# Patient Record
Sex: MALE | Race: BLACK OR AFRICAN AMERICAN | NOT HISPANIC OR LATINO | Employment: FULL TIME | URBAN - METROPOLITAN AREA
[De-identification: names, ages, dates, MRNs, and addresses within clinical notes are randomized per-mention and may not be internally consistent; named-entity substitution may affect disease eponyms.]

---

## 2024-11-26 ENCOUNTER — APPOINTMENT (OUTPATIENT)
Dept: RADIOLOGY | Facility: HOSPITAL | Age: 39
End: 2024-11-26

## 2024-11-26 ENCOUNTER — APPOINTMENT (OUTPATIENT)
Dept: CARDIOLOGY | Facility: HOSPITAL | Age: 39
End: 2024-11-26

## 2024-11-26 ENCOUNTER — HOSPITAL ENCOUNTER (INPATIENT)
Facility: HOSPITAL | Age: 39
LOS: 3 days | Discharge: HOME | End: 2024-11-29
Attending: STUDENT IN AN ORGANIZED HEALTH CARE EDUCATION/TRAINING PROGRAM | Admitting: INTERNAL MEDICINE

## 2024-11-26 DIAGNOSIS — E11.9 TYPE 2 DIABETES MELLITUS WITHOUT COMPLICATION, WITHOUT LONG-TERM CURRENT USE OF INSULIN (MULTI): ICD-10-CM

## 2024-11-26 DIAGNOSIS — I48.91 ATRIAL FIBRILLATION WITH RVR (MULTI): ICD-10-CM

## 2024-11-26 DIAGNOSIS — I10 HYPERTENSION, UNSPECIFIED TYPE: ICD-10-CM

## 2024-11-26 DIAGNOSIS — I50.9 ACUTE ON CHRONIC CONGESTIVE HEART FAILURE, UNSPECIFIED HEART FAILURE TYPE: Primary | ICD-10-CM

## 2024-11-26 DIAGNOSIS — J90 PLEURAL EFFUSION: ICD-10-CM

## 2024-11-26 LAB
ANION GAP SERPL CALC-SCNC: 10 MMOL/L (ref 10–20)
BASOPHILS # BLD AUTO: 0.05 X10*3/UL (ref 0–0.1)
BASOPHILS NFR BLD AUTO: 0.6 %
BNP SERPL-MCNC: 352 PG/ML (ref 0–99)
BUN SERPL-MCNC: 8 MG/DL (ref 6–23)
CALCIUM SERPL-MCNC: 8.7 MG/DL (ref 8.6–10.3)
CARDIAC TROPONIN I PNL SERPL HS: 55 NG/L (ref 0–20)
CARDIAC TROPONIN I PNL SERPL HS: 58 NG/L (ref 0–20)
CARDIAC TROPONIN I PNL SERPL HS: 61 NG/L (ref 0–20)
CHLORIDE SERPL-SCNC: 100 MMOL/L (ref 98–107)
CO2 SERPL-SCNC: 27 MMOL/L (ref 21–32)
CREAT SERPL-MCNC: 0.86 MG/DL (ref 0.5–1.3)
EGFRCR SERPLBLD CKD-EPI 2021: >90 ML/MIN/1.73M*2
EOSINOPHIL # BLD AUTO: 0.09 X10*3/UL (ref 0–0.7)
EOSINOPHIL NFR BLD AUTO: 1.1 %
ERYTHROCYTE [DISTWIDTH] IN BLOOD BY AUTOMATED COUNT: 13.1 % (ref 11.5–14.5)
GLUCOSE SERPL-MCNC: 161 MG/DL (ref 74–99)
HCT VFR BLD AUTO: 38.2 % (ref 41–52)
HGB BLD-MCNC: 12.9 G/DL (ref 13.5–17.5)
IMM GRANULOCYTES # BLD AUTO: 0.02 X10*3/UL (ref 0–0.7)
IMM GRANULOCYTES NFR BLD AUTO: 0.2 % (ref 0–0.9)
INR PPP: 1.4 (ref 0.9–1.1)
LYMPHOCYTES # BLD AUTO: 1.53 X10*3/UL (ref 1.2–4.8)
LYMPHOCYTES NFR BLD AUTO: 18.2 %
MCH RBC QN AUTO: 31.7 PG (ref 26–34)
MCHC RBC AUTO-ENTMCNC: 33.8 G/DL (ref 32–36)
MCV RBC AUTO: 94 FL (ref 80–100)
MONOCYTES # BLD AUTO: 0.7 X10*3/UL (ref 0.1–1)
MONOCYTES NFR BLD AUTO: 8.3 %
NEUTROPHILS # BLD AUTO: 6.02 X10*3/UL (ref 1.2–7.7)
NEUTROPHILS NFR BLD AUTO: 71.6 %
NRBC BLD-RTO: 0 /100 WBCS (ref 0–0)
PLATELET # BLD AUTO: 240 X10*3/UL (ref 150–450)
POTASSIUM SERPL-SCNC: 4.1 MMOL/L (ref 3.5–5.3)
PROTHROMBIN TIME: 16.3 SECONDS (ref 9.8–12.8)
RBC # BLD AUTO: 4.07 X10*6/UL (ref 4.5–5.9)
SODIUM SERPL-SCNC: 133 MMOL/L (ref 136–145)
WBC # BLD AUTO: 8.4 X10*3/UL (ref 4.4–11.3)

## 2024-11-26 PROCEDURE — 80048 BASIC METABOLIC PNL TOTAL CA: CPT | Performed by: STUDENT IN AN ORGANIZED HEALTH CARE EDUCATION/TRAINING PROGRAM

## 2024-11-26 PROCEDURE — 84484 ASSAY OF TROPONIN QUANT: CPT | Performed by: STUDENT IN AN ORGANIZED HEALTH CARE EDUCATION/TRAINING PROGRAM

## 2024-11-26 PROCEDURE — 2500000004 HC RX 250 GENERAL PHARMACY W/ HCPCS (ALT 636 FOR OP/ED): Performed by: STUDENT IN AN ORGANIZED HEALTH CARE EDUCATION/TRAINING PROGRAM

## 2024-11-26 PROCEDURE — 71275 CT ANGIOGRAPHY CHEST: CPT

## 2024-11-26 PROCEDURE — 71045 X-RAY EXAM CHEST 1 VIEW: CPT

## 2024-11-26 PROCEDURE — 2500000002 HC RX 250 W HCPCS SELF ADMINISTERED DRUGS (ALT 637 FOR MEDICARE OP, ALT 636 FOR OP/ED): Performed by: STUDENT IN AN ORGANIZED HEALTH CARE EDUCATION/TRAINING PROGRAM

## 2024-11-26 PROCEDURE — 36415 COLL VENOUS BLD VENIPUNCTURE: CPT | Performed by: STUDENT IN AN ORGANIZED HEALTH CARE EDUCATION/TRAINING PROGRAM

## 2024-11-26 PROCEDURE — 2500000004 HC RX 250 GENERAL PHARMACY W/ HCPCS (ALT 636 FOR OP/ED): Performed by: INTERNAL MEDICINE

## 2024-11-26 PROCEDURE — 2500000001 HC RX 250 WO HCPCS SELF ADMINISTERED DRUGS (ALT 637 FOR MEDICARE OP): Performed by: STUDENT IN AN ORGANIZED HEALTH CARE EDUCATION/TRAINING PROGRAM

## 2024-11-26 PROCEDURE — 93005 ELECTROCARDIOGRAM TRACING: CPT

## 2024-11-26 PROCEDURE — 85610 PROTHROMBIN TIME: CPT | Performed by: STUDENT IN AN ORGANIZED HEALTH CARE EDUCATION/TRAINING PROGRAM

## 2024-11-26 PROCEDURE — 96376 TX/PRO/DX INJ SAME DRUG ADON: CPT

## 2024-11-26 PROCEDURE — 96375 TX/PRO/DX INJ NEW DRUG ADDON: CPT

## 2024-11-26 PROCEDURE — 71045 X-RAY EXAM CHEST 1 VIEW: CPT | Performed by: RADIOLOGY

## 2024-11-26 PROCEDURE — 99285 EMERGENCY DEPT VISIT HI MDM: CPT | Mod: 25

## 2024-11-26 PROCEDURE — 96374 THER/PROPH/DIAG INJ IV PUSH: CPT

## 2024-11-26 PROCEDURE — 2550000001 HC RX 255 CONTRASTS: Performed by: STUDENT IN AN ORGANIZED HEALTH CARE EDUCATION/TRAINING PROGRAM

## 2024-11-26 PROCEDURE — 85025 COMPLETE CBC W/AUTO DIFF WBC: CPT | Performed by: STUDENT IN AN ORGANIZED HEALTH CARE EDUCATION/TRAINING PROGRAM

## 2024-11-26 PROCEDURE — 83880 ASSAY OF NATRIURETIC PEPTIDE: CPT | Performed by: STUDENT IN AN ORGANIZED HEALTH CARE EDUCATION/TRAINING PROGRAM

## 2024-11-26 PROCEDURE — 71275 CT ANGIOGRAPHY CHEST: CPT | Performed by: RADIOLOGY

## 2024-11-26 PROCEDURE — 1210000001 HC SEMI-PRIVATE ROOM DAILY

## 2024-11-26 RX ORDER — METOPROLOL TARTRATE 1 MG/ML
5 INJECTION, SOLUTION INTRAVENOUS ONCE
Status: COMPLETED | OUTPATIENT
Start: 2024-11-26 | End: 2024-11-26

## 2024-11-26 RX ORDER — WARFARIN SODIUM 5 MG/1
5 TABLET ORAL ONCE
Status: COMPLETED | OUTPATIENT
Start: 2024-11-26 | End: 2024-11-26

## 2024-11-26 RX ORDER — FUROSEMIDE 10 MG/ML
40 INJECTION INTRAMUSCULAR; INTRAVENOUS EVERY 12 HOURS
Status: DISCONTINUED | OUTPATIENT
Start: 2024-11-26 | End: 2024-11-27

## 2024-11-26 RX ORDER — DEXTROSE 50 % IN WATER (D50W) INTRAVENOUS SYRINGE
25
Status: DISCONTINUED | OUTPATIENT
Start: 2024-11-26 | End: 2024-11-29 | Stop reason: HOSPADM

## 2024-11-26 RX ORDER — METOPROLOL SUCCINATE 50 MG/1
50 TABLET, EXTENDED RELEASE ORAL DAILY
Status: DISCONTINUED | OUTPATIENT
Start: 2024-11-27 | End: 2024-11-27

## 2024-11-26 RX ORDER — FUROSEMIDE 10 MG/ML
40 INJECTION INTRAMUSCULAR; INTRAVENOUS ONCE
Status: COMPLETED | OUTPATIENT
Start: 2024-11-26 | End: 2024-11-26

## 2024-11-26 RX ORDER — SPIRONOLACTONE 25 MG/1
25 TABLET ORAL DAILY
Status: DISCONTINUED | OUTPATIENT
Start: 2024-11-27 | End: 2024-11-26

## 2024-11-26 RX ORDER — DEXTROSE 50 % IN WATER (D50W) INTRAVENOUS SYRINGE
12.5
Status: DISCONTINUED | OUTPATIENT
Start: 2024-11-26 | End: 2024-11-29 | Stop reason: HOSPADM

## 2024-11-26 RX ORDER — SPIRONOLACTONE 25 MG/1
25 TABLET ORAL DAILY
Status: DISCONTINUED | OUTPATIENT
Start: 2024-11-26 | End: 2024-11-29 | Stop reason: HOSPADM

## 2024-11-26 RX ORDER — METOPROLOL SUCCINATE 25 MG/1
25 TABLET, EXTENDED RELEASE ORAL ONCE
Status: COMPLETED | OUTPATIENT
Start: 2024-11-26 | End: 2024-11-26

## 2024-11-26 RX ORDER — LANOLIN ALCOHOL/MO/W.PET/CERES
100 CREAM (GRAM) TOPICAL DAILY
Status: DISCONTINUED | OUTPATIENT
Start: 2024-11-27 | End: 2024-11-29 | Stop reason: HOSPADM

## 2024-11-26 RX ORDER — INSULIN LISPRO 100 [IU]/ML
0-10 INJECTION, SOLUTION INTRAVENOUS; SUBCUTANEOUS
Status: DISCONTINUED | OUTPATIENT
Start: 2024-11-27 | End: 2024-11-29 | Stop reason: HOSPADM

## 2024-11-26 RX ORDER — MULTIVIT-MIN/IRON FUM/FOLIC AC 7.5 MG-4
1 TABLET ORAL DAILY
Status: DISCONTINUED | OUTPATIENT
Start: 2024-11-27 | End: 2024-11-29 | Stop reason: HOSPADM

## 2024-11-26 RX ORDER — FOLIC ACID 1 MG/1
1 TABLET ORAL DAILY
Status: DISCONTINUED | OUTPATIENT
Start: 2024-11-27 | End: 2024-11-29 | Stop reason: HOSPADM

## 2024-11-26 ASSESSMENT — PAIN SCALES - GENERAL: PAINLEVEL_OUTOF10: 0 - NO PAIN

## 2024-11-26 ASSESSMENT — PAIN - FUNCTIONAL ASSESSMENT: PAIN_FUNCTIONAL_ASSESSMENT: 0-10

## 2024-11-26 ASSESSMENT — ENCOUNTER SYMPTOMS
ENDOCRINE NEGATIVE: 1
ALLERGIC/IMMUNOLOGIC NEGATIVE: 1
GASTROINTESTINAL NEGATIVE: 1
FATIGUE: 1
PSYCHIATRIC NEGATIVE: 1
EYES NEGATIVE: 1
NEUROLOGICAL NEGATIVE: 1
SHORTNESS OF BREATH: 1
HEMATOLOGIC/LYMPHATIC NEGATIVE: 1
ACTIVITY CHANGE: 1
MUSCULOSKELETAL NEGATIVE: 1

## 2024-11-26 ASSESSMENT — COLUMBIA-SUICIDE SEVERITY RATING SCALE - C-SSRS
1. IN THE PAST MONTH, HAVE YOU WISHED YOU WERE DEAD OR WISHED YOU COULD GO TO SLEEP AND NOT WAKE UP?: NO
2. HAVE YOU ACTUALLY HAD ANY THOUGHTS OF KILLING YOURSELF?: NO
6. HAVE YOU EVER DONE ANYTHING, STARTED TO DO ANYTHING, OR PREPARED TO DO ANYTHING TO END YOUR LIFE?: NO

## 2024-11-26 NOTE — ED TRIAGE NOTES
Pt. To ED for feet swelling and pain, SOB. Pt. States he was recently diagnosed with heart failure and DM.  Pt. Non compliant with medications

## 2024-11-26 NOTE — ED PROVIDER NOTES
Emergency Department Provider Note        History of Present Illness     Chief Complaint: Shortness of Breath, Leg Swelling, and Foot Swelling   History provided by: Patient  Limitations to History: None  External Chart Review: See ED Course    HPI:  Haider Ruiz is a 39 y.o. male with PMHx of CHF, HTN, T2DM, A fib presenting to the ED for shortness of breath and lower extremity edema.  Patient reports over the past month he has developed progressively worsening bilateral lower extremity edema.  Has some associated orthopnea and dyspnea on exertion but denies chest pain.  Denies fevers, chills, cough.  Reports that he is supposed to be on several medications but has not been taking any of them for the past few months because he was feeling well.    Physical Exam   Triage vitals:  T 36.6 °C (97.8 °F)  HR (!) 132  BP (!) 195/154  RR (!) 22  O2 97 % None (Room air)    Constitutional: Awake, alert, not in acute distress  HENT: Head atraumatic, mucous membranes moist  Eyes:  Conjunctivae normal  Neck:  Supple  Lungs: Clear to auscultation, breath sounds equal and symmetric, no wheezes, rales, or rhonchi  Heart: Tachycardic rate, irregular rhythm, no murmur, rub or gallop  Abdomen: Soft, nontender, nondistended, no rebound or guarding  Extremities: 2+ bilateral lower extremity edema  Neuro: Alert, no focal deficit  Skin: Warm, dry  Psych: Calm, cooperative       Medical Decision Making & ED Course   Medical Decision Making:  Haider Ruiz is a 39 y.o. male with PMHx as above in HPI who presented to the ED for SOB and BLE edema. Patient was afebrile, tachycardic though normotensive, and satting on room air on arrival.     Exam as above.    EKG showed sinus tachycardia. Though later on personal review and interpretation of telemetry monitor noted to be in a fib vs flutter with RVR.    CXR without evidence of PTX, PNA, widened mediastinum, or pulmonary edema.  CTPE negative for PE.    Labs below in ED course, notable  for CBC: slight anemia, BMP: no clinically significant electrolyte abnormalities, no ROXY high sensitivity troponin elevated but stable on repeat, doubt ACS, BNP mildly elevated though may be falsely reassuring in setting of pt's body habitus.     His home meds were restarted. Diuresis was initiated with lasix. He was given IV metop x2 with improvement in HR. He will require admission for further diuresis and re-initiation of home meds.     Patient signed out to Dr Roach. Pending admit.  ------------------------------------------------  Independent Test Interpretation: See ED Course    ED Course:  ED Course as of 11/26/24 2342   Tue Nov 26, 2024   1448 External chart review:  DC summary Feb 2024  Admitted for  HTNive emergency     [SS]   1504 ECG 12 lead  I have personally reviewed and interpreted this EKG.  Sinus tachycardia, rate 138 BPM  Normal axis  IA interval and QRS duration within normal limits.  QTc within normal limits.  No signs of acute ischemia or infarction [SS]   1518 XR chest 1 view  I have personally reviewed and interpreted this CXR, which shows enlarged cardiac silhouette, no PTX or PNA. Final decision making pending radiology read.   [SS]   1523 XR chest 1 view  Radiology read:  IMPRESSION:  1. Mild enlargement of the cardiac silhouette. No acute abnormality   [SS]   1602 CBC and Auto Differential(!)  anemia, no leukocytosis or thrombocytopenia [SS]   1627 Basic metabolic panel(!)  Hyperglycemia without evidence of DKA, slight hyponatremia, no ROXY [SS]   1628 BNP(!): 352  Mild elevation [SS]   1641 Troponin I, High Sensitivity(!!): 55  Mild elevation will trend [SS]   1839 CT angio chest for pulmonary embolism  Radiology read:  IMPRESSION:  1. Limited examination with suboptimal assessment of the lobar,  segmental, and subsegmental arteries. No large central filling defect  within the main pulmonary arteries.  2. Enlargement of the main pulmonary artery which may be seen in the  setting of  pulmonary arterial hypertension.  3. Cardiomegaly.  4. Small right pleural effusion and adjacent atelectasis.  5. Borderline aneurysmal dilatation of the ascending thoracic aorta  measuring up to 3.9 cm. Continued surveillance recommended.   [SS]   1853 Troponin I, High Sensitivity(!!): 61  Slight uptrend in setting of CHFe will cont to trend [SS]   2128 ECG read and interpreted by me.  Atrial flutter with variable AV block, rate 102.  Normal axis.  Normal intervals.  No ST or T wave derangements. [CG]   2222 Patient was accepted to the IM service by Dr. Tavares. [CG]      ED Course User Index  [CG] Graciela Roach MD  [SS] Steffen Simerlink, MD         Diagnoses as of 11/26/24 2345   Acute on chronic congestive heart failure, unspecified heart failure type   Pleural effusion   Hypertension, unspecified type   Atrial fibrillation with RVR (Multi)     Disposition   As a result of their workup, the patient will require admission to the hospital.  The patient was informed of his diagnosis.  The patient was given the opportunity to ask questions and I answered them. The patient agreed to be admitted to the hospital.    Procedures   Procedures    Steffen Simerlink, MD Steffen Simerlink, MD  11/26/24 5694

## 2024-11-26 NOTE — Clinical Note
ED  Recommendation: IP ED-UM Rec. (Upgraded to Ip after discussion of case with Dr. Simerlink) (11/26/24 2030 : Marissa Rivera RN)

## 2024-11-27 ENCOUNTER — ANESTHESIA EVENT (OUTPATIENT)
Dept: CARDIOLOGY | Facility: HOSPITAL | Age: 39
End: 2024-11-27

## 2024-11-27 ENCOUNTER — APPOINTMENT (OUTPATIENT)
Dept: CARDIOLOGY | Facility: HOSPITAL | Age: 39
End: 2024-11-27

## 2024-11-27 ENCOUNTER — ANESTHESIA (OUTPATIENT)
Dept: CARDIOLOGY | Facility: HOSPITAL | Age: 39
End: 2024-11-27

## 2024-11-27 LAB
ANION GAP SERPL CALC-SCNC: 10 MMOL/L (ref 10–20)
ATRIAL RATE: 138 BPM
BUN SERPL-MCNC: 9 MG/DL (ref 6–23)
CALCIUM SERPL-MCNC: 7.9 MG/DL (ref 8.6–10.3)
CHLORIDE SERPL-SCNC: 99 MMOL/L (ref 98–107)
CHOLEST SERPL-MCNC: 98 MG/DL (ref 0–199)
CHOLESTEROL/HDL RATIO: 4.9
CO2 SERPL-SCNC: 27 MMOL/L (ref 21–32)
CREAT SERPL-MCNC: 0.94 MG/DL (ref 0.5–1.3)
EGFRCR SERPLBLD CKD-EPI 2021: >90 ML/MIN/1.73M*2
ERYTHROCYTE [DISTWIDTH] IN BLOOD BY AUTOMATED COUNT: 13.2 % (ref 11.5–14.5)
EST. AVERAGE GLUCOSE BLD GHB EST-MCNC: 171 MG/DL
GLUCOSE BLD MANUAL STRIP-MCNC: 131 MG/DL (ref 74–99)
GLUCOSE BLD MANUAL STRIP-MCNC: 133 MG/DL (ref 74–99)
GLUCOSE BLD MANUAL STRIP-MCNC: 160 MG/DL (ref 74–99)
GLUCOSE SERPL-MCNC: 133 MG/DL (ref 74–99)
HBA1C MFR BLD: 7.6 %
HCT VFR BLD AUTO: 36.7 % (ref 41–52)
HDLC SERPL-MCNC: 19.9 MG/DL
HGB BLD-MCNC: 12.2 G/DL (ref 13.5–17.5)
INR PPP: 1.6 (ref 0.9–1.1)
LDLC SERPL CALC-MCNC: 63 MG/DL
MAGNESIUM SERPL-MCNC: 1.67 MG/DL (ref 1.6–2.4)
MCH RBC QN AUTO: 32 PG (ref 26–34)
MCHC RBC AUTO-ENTMCNC: 33.2 G/DL (ref 32–36)
MCV RBC AUTO: 96 FL (ref 80–100)
NON HDL CHOLESTEROL: 78 MG/DL (ref 0–149)
NRBC BLD-RTO: 0 /100 WBCS (ref 0–0)
P AXIS: 76 DEGREES
P OFFSET: 179 MS
P ONSET: 149 MS
PLATELET # BLD AUTO: 240 X10*3/UL (ref 150–450)
POTASSIUM SERPL-SCNC: 3.4 MMOL/L (ref 3.5–5.3)
PR INTERVAL: 138 MS
PROTHROMBIN TIME: 17.6 SECONDS (ref 9.8–12.8)
Q ONSET: 218 MS
QRS COUNT: 23 BEATS
QRS DURATION: 98 MS
QT INTERVAL: 374 MS
QTC CALCULATION(BAZETT): 566 MS
QTC FREDERICIA: 493 MS
R AXIS: 51 DEGREES
RBC # BLD AUTO: 3.81 X10*6/UL (ref 4.5–5.9)
SODIUM SERPL-SCNC: 133 MMOL/L (ref 136–145)
T AXIS: 3 DEGREES
T OFFSET: 405 MS
TRIGL SERPL-MCNC: 75 MG/DL (ref 0–149)
TSH SERPL-ACNC: 2.33 MIU/L (ref 0.44–3.98)
VENTRICULAR RATE: 138 BPM
VLDL: 15 MG/DL (ref 0–40)
WBC # BLD AUTO: 9.1 X10*3/UL (ref 4.4–11.3)

## 2024-11-27 PROCEDURE — 93325 DOPPLER ECHO COLOR FLOW MAPG: CPT

## 2024-11-27 PROCEDURE — 93010 ELECTROCARDIOGRAM REPORT: CPT | Performed by: INTERNAL MEDICINE

## 2024-11-27 PROCEDURE — 2500000004 HC RX 250 GENERAL PHARMACY W/ HCPCS (ALT 636 FOR OP/ED): Performed by: NURSE PRACTITIONER

## 2024-11-27 PROCEDURE — 85027 COMPLETE CBC AUTOMATED: CPT | Performed by: INTERNAL MEDICINE

## 2024-11-27 PROCEDURE — 93320 DOPPLER ECHO COMPLETE: CPT

## 2024-11-27 PROCEDURE — 93312 ECHO TRANSESOPHAGEAL: CPT

## 2024-11-27 PROCEDURE — 83036 HEMOGLOBIN GLYCOSYLATED A1C: CPT | Mod: AHULAB | Performed by: NURSE PRACTITIONER

## 2024-11-27 PROCEDURE — 2500000004 HC RX 250 GENERAL PHARMACY W/ HCPCS (ALT 636 FOR OP/ED): Performed by: ANESTHESIOLOGIST ASSISTANT

## 2024-11-27 PROCEDURE — 3700000001 HC GENERAL ANESTHESIA TIME - INITIAL BASE CHARGE

## 2024-11-27 PROCEDURE — 2500000001 HC RX 250 WO HCPCS SELF ADMINISTERED DRUGS (ALT 637 FOR MEDICARE OP): Performed by: STUDENT IN AN ORGANIZED HEALTH CARE EDUCATION/TRAINING PROGRAM

## 2024-11-27 PROCEDURE — 80048 BASIC METABOLIC PNL TOTAL CA: CPT | Performed by: INTERNAL MEDICINE

## 2024-11-27 PROCEDURE — 82947 ASSAY GLUCOSE BLOOD QUANT: CPT

## 2024-11-27 PROCEDURE — 5A2204Z RESTORATION OF CARDIAC RHYTHM, SINGLE: ICD-10-PCS

## 2024-11-27 PROCEDURE — 2500000001 HC RX 250 WO HCPCS SELF ADMINISTERED DRUGS (ALT 637 FOR MEDICARE OP): Performed by: INTERNAL MEDICINE

## 2024-11-27 PROCEDURE — 99222 1ST HOSP IP/OBS MODERATE 55: CPT

## 2024-11-27 PROCEDURE — 2500000002 HC RX 250 W HCPCS SELF ADMINISTERED DRUGS (ALT 637 FOR MEDICARE OP, ALT 636 FOR OP/ED): Performed by: INTERNAL MEDICINE

## 2024-11-27 PROCEDURE — 99223 1ST HOSP IP/OBS HIGH 75: CPT | Performed by: NURSE PRACTITIONER

## 2024-11-27 PROCEDURE — A93312 PR ECHO HEART,TRANSESOPHAGEAL,COMPLETE: Performed by: STUDENT IN AN ORGANIZED HEALTH CARE EDUCATION/TRAINING PROGRAM

## 2024-11-27 PROCEDURE — 83735 ASSAY OF MAGNESIUM: CPT | Performed by: INTERNAL MEDICINE

## 2024-11-27 PROCEDURE — 2500000002 HC RX 250 W HCPCS SELF ADMINISTERED DRUGS (ALT 637 FOR MEDICARE OP, ALT 636 FOR OP/ED): Performed by: STUDENT IN AN ORGANIZED HEALTH CARE EDUCATION/TRAINING PROGRAM

## 2024-11-27 PROCEDURE — 2500000005 HC RX 250 GENERAL PHARMACY W/O HCPCS

## 2024-11-27 PROCEDURE — 2500000005 HC RX 250 GENERAL PHARMACY W/O HCPCS: Performed by: ANESTHESIOLOGIST ASSISTANT

## 2024-11-27 PROCEDURE — 36415 COLL VENOUS BLD VENIPUNCTURE: CPT | Performed by: INTERNAL MEDICINE

## 2024-11-27 PROCEDURE — A93312 PR ECHO HEART,TRANSESOPHAGEAL,COMPLETE: Performed by: ANESTHESIOLOGIST ASSISTANT

## 2024-11-27 PROCEDURE — 2500000004 HC RX 250 GENERAL PHARMACY W/ HCPCS (ALT 636 FOR OP/ED): Performed by: INTERNAL MEDICINE

## 2024-11-27 PROCEDURE — 85610 PROTHROMBIN TIME: CPT | Performed by: INTERNAL MEDICINE

## 2024-11-27 PROCEDURE — 99232 SBSQ HOSP IP/OBS MODERATE 35: CPT | Performed by: INTERNAL MEDICINE

## 2024-11-27 PROCEDURE — 84443 ASSAY THYROID STIM HORMONE: CPT | Performed by: NURSE PRACTITIONER

## 2024-11-27 PROCEDURE — 82374 ASSAY BLOOD CARBON DIOXIDE: CPT | Performed by: INTERNAL MEDICINE

## 2024-11-27 PROCEDURE — 2500000001 HC RX 250 WO HCPCS SELF ADMINISTERED DRUGS (ALT 637 FOR MEDICARE OP): Performed by: NURSE PRACTITIONER

## 2024-11-27 PROCEDURE — 2500000002 HC RX 250 W HCPCS SELF ADMINISTERED DRUGS (ALT 637 FOR MEDICARE OP, ALT 636 FOR OP/ED)

## 2024-11-27 PROCEDURE — 1100000001 HC PRIVATE ROOM DAILY

## 2024-11-27 PROCEDURE — 3700000002 HC GENERAL ANESTHESIA TIME - EACH INCREMENTAL 1 MINUTE

## 2024-11-27 PROCEDURE — 84478 ASSAY OF TRIGLYCERIDES: CPT | Performed by: NURSE PRACTITIONER

## 2024-11-27 PROCEDURE — 92960 CARDIOVERSION ELECTRIC EXT: CPT

## 2024-11-27 RX ORDER — METOPROLOL SUCCINATE 50 MG/1
50 TABLET, EXTENDED RELEASE ORAL DAILY
Status: DISCONTINUED | OUTPATIENT
Start: 2024-11-27 | End: 2024-11-29 | Stop reason: HOSPADM

## 2024-11-27 RX ORDER — WARFARIN SODIUM 5 MG/1
10 TABLET ORAL ONCE
Status: COMPLETED | OUTPATIENT
Start: 2024-11-27 | End: 2024-11-27

## 2024-11-27 RX ORDER — LIDOCAINE HYDROCHLORIDE 10 MG/ML
0.1 INJECTION, SOLUTION EPIDURAL; INFILTRATION; INTRACAUDAL; PERINEURAL ONCE
Status: DISCONTINUED | OUTPATIENT
Start: 2024-11-27 | End: 2024-11-29 | Stop reason: HOSPADM

## 2024-11-27 RX ORDER — LIDOCAINE HYDROCHLORIDE 20 MG/ML
SOLUTION OROPHARYNGEAL AS NEEDED
Status: DISCONTINUED | OUTPATIENT
Start: 2024-11-27 | End: 2024-11-27 | Stop reason: HOSPADM

## 2024-11-27 RX ORDER — PANTOPRAZOLE SODIUM 40 MG/1
40 TABLET, DELAYED RELEASE ORAL
Status: DISCONTINUED | OUTPATIENT
Start: 2024-11-27 | End: 2024-11-29 | Stop reason: HOSPADM

## 2024-11-27 RX ORDER — ACETAMINOPHEN 650 MG/1
650 SUPPOSITORY RECTAL EVERY 4 HOURS PRN
Status: DISCONTINUED | OUTPATIENT
Start: 2024-11-27 | End: 2024-11-29 | Stop reason: HOSPADM

## 2024-11-27 RX ORDER — ONDANSETRON HYDROCHLORIDE 2 MG/ML
4 INJECTION, SOLUTION INTRAVENOUS ONCE AS NEEDED
Status: DISCONTINUED | OUTPATIENT
Start: 2024-11-27 | End: 2024-11-29 | Stop reason: HOSPADM

## 2024-11-27 RX ORDER — PANTOPRAZOLE SODIUM 40 MG/10ML
40 INJECTION, POWDER, LYOPHILIZED, FOR SOLUTION INTRAVENOUS
Status: DISCONTINUED | OUTPATIENT
Start: 2024-11-27 | End: 2024-11-29 | Stop reason: HOSPADM

## 2024-11-27 RX ORDER — MIDAZOLAM HYDROCHLORIDE 1 MG/ML
INJECTION INTRAMUSCULAR; INTRAVENOUS AS NEEDED
Status: DISCONTINUED | OUTPATIENT
Start: 2024-11-27 | End: 2024-11-27

## 2024-11-27 RX ORDER — PROPOFOL 10 MG/ML
INJECTION, EMULSION INTRAVENOUS AS NEEDED
Status: DISCONTINUED | OUTPATIENT
Start: 2024-11-27 | End: 2024-11-27

## 2024-11-27 RX ORDER — WARFARIN SODIUM 5 MG/1
7.5 TABLET ORAL
Status: ON HOLD | COMMUNITY
Start: 2024-03-04 | End: 2024-11-29

## 2024-11-27 RX ORDER — METFORMIN HYDROCHLORIDE 500 MG/1
1 TABLET ORAL
Status: ON HOLD | COMMUNITY
Start: 2024-03-04 | End: 2024-11-29

## 2024-11-27 RX ORDER — METOPROLOL TARTRATE 1 MG/ML
5 INJECTION, SOLUTION INTRAVENOUS ONCE
Status: COMPLETED | OUTPATIENT
Start: 2024-11-27 | End: 2024-11-27

## 2024-11-27 RX ORDER — METOPROLOL TARTRATE 1 MG/ML
5 INJECTION, SOLUTION INTRAVENOUS EVERY 6 HOURS PRN
Status: DISCONTINUED | OUTPATIENT
Start: 2024-11-27 | End: 2024-11-29 | Stop reason: HOSPADM

## 2024-11-27 RX ORDER — ACETAMINOPHEN 325 MG/1
650 TABLET ORAL EVERY 4 HOURS PRN
Status: DISCONTINUED | OUTPATIENT
Start: 2024-11-27 | End: 2024-11-29 | Stop reason: HOSPADM

## 2024-11-27 RX ORDER — LABETALOL HYDROCHLORIDE 5 MG/ML
5 INJECTION, SOLUTION INTRAVENOUS ONCE AS NEEDED
Status: DISCONTINUED | OUTPATIENT
Start: 2024-11-27 | End: 2024-11-29 | Stop reason: HOSPADM

## 2024-11-27 RX ORDER — POLYETHYLENE GLYCOL 3350 17 G/17G
17 POWDER, FOR SOLUTION ORAL DAILY PRN
COMMUNITY
Start: 2024-03-03

## 2024-11-27 RX ORDER — POTASSIUM CHLORIDE 20 MEQ/1
40 TABLET, EXTENDED RELEASE ORAL ONCE
Status: COMPLETED | OUTPATIENT
Start: 2024-11-27 | End: 2024-11-27

## 2024-11-27 RX ORDER — ONDANSETRON HYDROCHLORIDE 2 MG/ML
4 INJECTION, SOLUTION INTRAVENOUS EVERY 8 HOURS PRN
Status: DISCONTINUED | OUTPATIENT
Start: 2024-11-27 | End: 2024-11-29 | Stop reason: HOSPADM

## 2024-11-27 RX ORDER — SPIRONOLACTONE 25 MG/1
25 TABLET ORAL
Status: ON HOLD | COMMUNITY
Start: 2024-03-04 | End: 2024-11-29

## 2024-11-27 RX ORDER — ACETAMINOPHEN 160 MG/5ML
650 SOLUTION ORAL EVERY 4 HOURS PRN
Status: DISCONTINUED | OUTPATIENT
Start: 2024-11-27 | End: 2024-11-29 | Stop reason: HOSPADM

## 2024-11-27 RX ORDER — FUROSEMIDE 40 MG/1
40 TABLET ORAL
COMMUNITY
Start: 2024-03-03 | End: 2024-11-29 | Stop reason: HOSPADM

## 2024-11-27 RX ORDER — ETOMIDATE 2 MG/ML
INJECTION INTRAVENOUS AS NEEDED
Status: DISCONTINUED | OUTPATIENT
Start: 2024-11-27 | End: 2024-11-27

## 2024-11-27 RX ORDER — ONDANSETRON 4 MG/1
4 TABLET, FILM COATED ORAL EVERY 8 HOURS PRN
Status: DISCONTINUED | OUTPATIENT
Start: 2024-11-27 | End: 2024-11-29 | Stop reason: HOSPADM

## 2024-11-27 RX ORDER — FUROSEMIDE 10 MG/ML
80 INJECTION INTRAMUSCULAR; INTRAVENOUS EVERY 12 HOURS
Status: DISCONTINUED | OUTPATIENT
Start: 2024-11-27 | End: 2024-11-28

## 2024-11-27 SDOH — ECONOMIC STABILITY: INCOME INSECURITY: IN THE PAST 12 MONTHS HAS THE ELECTRIC, GAS, OIL, OR WATER COMPANY THREATENED TO SHUT OFF SERVICES IN YOUR HOME?: NO

## 2024-11-27 SDOH — HEALTH STABILITY: MENTAL HEALTH: HOW OFTEN DO YOU HAVE A DRINK CONTAINING ALCOHOL?: 2-3 TIMES A WEEK

## 2024-11-27 SDOH — SOCIAL STABILITY: SOCIAL INSECURITY: WITHIN THE LAST YEAR, HAVE YOU BEEN AFRAID OF YOUR PARTNER OR EX-PARTNER?: NO

## 2024-11-27 SDOH — SOCIAL STABILITY: SOCIAL INSECURITY
WITHIN THE LAST YEAR, HAVE YOU BEEN KICKED, HIT, SLAPPED, OR OTHERWISE PHYSICALLY HURT BY YOUR PARTNER OR EX-PARTNER?: NO

## 2024-11-27 SDOH — SOCIAL STABILITY: SOCIAL INSECURITY
WITHIN THE LAST YEAR, HAVE YOU BEEN RAPED OR FORCED TO HAVE ANY KIND OF SEXUAL ACTIVITY BY YOUR PARTNER OR EX-PARTNER?: NO

## 2024-11-27 SDOH — ECONOMIC STABILITY: FOOD INSECURITY: WITHIN THE PAST 12 MONTHS, YOU WORRIED THAT YOUR FOOD WOULD RUN OUT BEFORE YOU GOT THE MONEY TO BUY MORE.: NEVER TRUE

## 2024-11-27 SDOH — HEALTH STABILITY: MENTAL HEALTH: HOW OFTEN DO YOU HAVE SIX OR MORE DRINKS ON ONE OCCASION?: NEVER

## 2024-11-27 SDOH — HEALTH STABILITY: MENTAL HEALTH: HOW MANY DRINKS CONTAINING ALCOHOL DO YOU HAVE ON A TYPICAL DAY WHEN YOU ARE DRINKING?: 1 OR 2

## 2024-11-27 SDOH — SOCIAL STABILITY: SOCIAL INSECURITY: WERE YOU ABLE TO COMPLETE ALL THE BEHAVIORAL HEALTH SCREENINGS?: YES

## 2024-11-27 SDOH — SOCIAL STABILITY: SOCIAL INSECURITY: WITHIN THE LAST YEAR, HAVE YOU BEEN HUMILIATED OR EMOTIONALLY ABUSED IN OTHER WAYS BY YOUR PARTNER OR EX-PARTNER?: NO

## 2024-11-27 SDOH — HEALTH STABILITY: MENTAL HEALTH: CURRENT SMOKER: 0

## 2024-11-27 SDOH — ECONOMIC STABILITY: FOOD INSECURITY: WITHIN THE PAST 12 MONTHS, THE FOOD YOU BOUGHT JUST DIDN'T LAST AND YOU DIDN'T HAVE MONEY TO GET MORE.: NEVER TRUE

## 2024-11-27 SDOH — SOCIAL STABILITY: SOCIAL INSECURITY: HAVE YOU HAD THOUGHTS OF HARMING ANYONE ELSE?: NO

## 2024-11-27 ASSESSMENT — ENCOUNTER SYMPTOMS
NEUROLOGICAL NEGATIVE: 1
PSYCHIATRIC NEGATIVE: 1
GASTROINTESTINAL NEGATIVE: 1
HEMATOLOGIC/LYMPHATIC NEGATIVE: 1
RESPIRATORY NEGATIVE: 1
CARDIOVASCULAR NEGATIVE: 1
ALLERGIC/IMMUNOLOGIC NEGATIVE: 1
MUSCULOSKELETAL NEGATIVE: 1
ENDOCRINE NEGATIVE: 1
CONSTITUTIONAL NEGATIVE: 1
EYES NEGATIVE: 1

## 2024-11-27 ASSESSMENT — PAIN SCALES - GENERAL
PAINLEVEL_OUTOF10: 0 - NO PAIN
PAIN_LEVEL: 0
PAINLEVEL_OUTOF10: 0 - NO PAIN

## 2024-11-27 ASSESSMENT — ACTIVITIES OF DAILY LIVING (ADL)
DRESSING YOURSELF: INDEPENDENT
TOILETING: INDEPENDENT
LACK_OF_TRANSPORTATION: NO
FEEDING YOURSELF: INDEPENDENT
ADEQUATE_TO_COMPLETE_ADL: YES
LACK_OF_TRANSPORTATION: NO
BATHING: INDEPENDENT
PATIENT'S MEMORY ADEQUATE TO SAFELY COMPLETE DAILY ACTIVITIES?: YES
JUDGMENT_ADEQUATE_SAFELY_COMPLETE_DAILY_ACTIVITIES: YES
HEARING - LEFT EAR: FUNCTIONAL
WALKS IN HOME: INDEPENDENT
GROOMING: INDEPENDENT
HEARING - RIGHT EAR: FUNCTIONAL

## 2024-11-27 ASSESSMENT — PAIN - FUNCTIONAL ASSESSMENT
PAIN_FUNCTIONAL_ASSESSMENT: 0-10

## 2024-11-27 ASSESSMENT — COGNITIVE AND FUNCTIONAL STATUS - GENERAL
DAILY ACTIVITIY SCORE: 24
PATIENT BASELINE BEDBOUND: NO
MOBILITY SCORE: 24

## 2024-11-27 ASSESSMENT — PATIENT HEALTH QUESTIONNAIRE - PHQ9
2. FEELING DOWN, DEPRESSED OR HOPELESS: NOT AT ALL
SUM OF ALL RESPONSES TO PHQ9 QUESTIONS 1 & 2: 0
1. LITTLE INTEREST OR PLEASURE IN DOING THINGS: NOT AT ALL

## 2024-11-27 ASSESSMENT — LIFESTYLE VARIABLES
SKIP TO QUESTIONS 9-10: 1
AUDIT-C TOTAL SCORE: 3

## 2024-11-27 NOTE — NURSING NOTE
1408: Report called to 5th floor nurse Gertrude LAIRD  1410: Called cath lab that patient will be transferred to 520 after procedure.  1533: Patient belongings brought to room 520 by undersigned.

## 2024-11-27 NOTE — H&P
History Of Present Illness  Haider Ruiz is a 39 y.o. male with a past medical history of morbid obesity daily BMI of 43.6, systolic CHF ejection fraction 35%, paroxysmal atrial fibrillation, hypertension, type 2 diabetes mellitus, medical noncompliance and undiagnosed NESTOR who presented to [facility] ER complaining of shortness of breath especially with exertion and lower extremity edema bilaterally.  His symptoms have become progressively worse over the past month especially the last week.  He denies any chest pain abdominal pain fever chills nausea vomiting or diarrhea.  He admits to not taking his medications over the past few months.     Past Medical History  Systolic CHF LVEF 35%  2+ MR  Diastolic dysfunction not evaluated due to 2+ MR  Moderate TR 2+  Morbid obesity  Hypertension  Medical noncompliance  Type 2 diabetes mellitus  Untreated NESTOR  Paroxysmal atrial fibrillation on warfarin    Surgical History  Tobacco no  Illicit drug use no  Yes alcohol usually continue 3 times a week about 20 Oz     Social History  He has no history on file for tobacco use, alcohol use, and drug use.    Family History  Cervical cancer mother  COPD Father     Allergies  Patient has no known allergies.    Review of Systems   Constitutional:  Positive for activity change and fatigue.   HENT: Negative.     Eyes: Negative.    Respiratory:  Positive for shortness of breath.    Cardiovascular:  Positive for leg swelling.   Gastrointestinal: Negative.    Endocrine: Negative.    Genitourinary: Negative.    Musculoskeletal: Negative.    Skin: Negative.    Allergic/Immunologic: Negative.    Neurological: Negative.    Hematological: Negative.    Psychiatric/Behavioral: Negative.     All other systems reviewed and are negative.       Physical Exam  Vitals and nursing note reviewed.   Constitutional:       Appearance: Normal appearance. He is obese. He is ill-appearing.   HENT:      Head: Normocephalic.      Right Ear: External  "ear normal.      Left Ear: External ear normal.      Nose: Nose normal.      Mouth/Throat:      Mouth: Mucous membranes are dry.      Pharynx: Oropharynx is clear.   Eyes:      Extraocular Movements: Extraocular movements intact.      Conjunctiva/sclera: Conjunctivae normal.      Pupils: Pupils are equal, round, and reactive to light.   Neck:      Comments: JVD  Cardiovascular:      Rate and Rhythm: Regular rhythm. Tachycardia present.      Comments: Monitor sinus tachycardia with an incomplete left bundle branch block ventricular rate 138  Pulmonary:      Effort: Pulmonary effort is normal.      Breath sounds: Normal breath sounds.   Abdominal:      General: Abdomen is flat. Bowel sounds are normal.      Palpations: Abdomen is soft.   Musculoskeletal:         General: Normal range of motion.      Right lower leg: Edema present.      Left lower leg: Edema present.   Skin:     General: Skin is warm and dry.   Neurological:      General: No focal deficit present.      Mental Status: He is alert. Mental status is at baseline.   Psychiatric:         Mood and Affect: Mood normal.         Behavior: Behavior normal.          Last Recorded Vitals  Blood pressure (!) 150/98, pulse (!) 115, temperature 36.6 °C (97.8 °F), temperature source Temporal, resp. rate (!) 21, height 1.88 m (6' 2\"), weight (!) 154 kg (340 lb), SpO2 99%.    Relevant Results  Meds:  Scheduled medications  sacubitriL-valsartan, 1 tablet, oral, BID  spironolactone, 25 mg, oral, Daily      Continuous medications     PRN medications     No current outpatient medications     Labs:  Results for orders placed or performed during the hospital encounter of 11/26/24 (from the past 24 hours)   CBC and Auto Differential   Result Value Ref Range    WBC 8.4 4.4 - 11.3 x10*3/uL    nRBC 0.0 0.0 - 0.0 /100 WBCs    RBC 4.07 (L) 4.50 - 5.90 x10*6/uL    Hemoglobin 12.9 (L) 13.5 - 17.5 g/dL    Hematocrit 38.2 (L) 41.0 - 52.0 %    MCV 94 80 - 100 fL    MCH 31.7 26.0 - 34.0 " pg    MCHC 33.8 32.0 - 36.0 g/dL    RDW 13.1 11.5 - 14.5 %    Platelets 240 150 - 450 x10*3/uL    Neutrophils % 71.6 40.0 - 80.0 %    Immature Granulocytes %, Automated 0.2 0.0 - 0.9 %    Lymphocytes % 18.2 13.0 - 44.0 %    Monocytes % 8.3 2.0 - 10.0 %    Eosinophils % 1.1 0.0 - 6.0 %    Basophils % 0.6 0.0 - 2.0 %    Neutrophils Absolute 6.02 1.20 - 7.70 x10*3/uL    Immature Granulocytes Absolute, Automated 0.02 0.00 - 0.70 x10*3/uL    Lymphocytes Absolute 1.53 1.20 - 4.80 x10*3/uL    Monocytes Absolute 0.70 0.10 - 1.00 x10*3/uL    Eosinophils Absolute 0.09 0.00 - 0.70 x10*3/uL    Basophils Absolute 0.05 0.00 - 0.10 x10*3/uL   Basic metabolic panel   Result Value Ref Range    Glucose 161 (H) 74 - 99 mg/dL    Sodium 133 (L) 136 - 145 mmol/L    Potassium 4.1 3.5 - 5.3 mmol/L    Chloride 100 98 - 107 mmol/L    Bicarbonate 27 21 - 32 mmol/L    Anion Gap 10 10 - 20 mmol/L    Urea Nitrogen 8 6 - 23 mg/dL    Creatinine 0.86 0.50 - 1.30 mg/dL    eGFR >90 >60 mL/min/1.73m*2    Calcium 8.7 8.6 - 10.3 mg/dL   B-Type Natriuretic Peptide   Result Value Ref Range     (H) 0 - 99 pg/mL   Troponin I, High Sensitivity, Initial   Result Value Ref Range    Troponin I, High Sensitivity 55 (HH) 0 - 20 ng/L   Protime-INR   Result Value Ref Range    Protime 16.3 (H) 9.8 - 12.8 seconds    INR 1.4 (H) 0.9 - 1.1   Troponin, High Sensitivity, 1 Hour   Result Value Ref Range    Troponin I, High Sensitivity 61 (HH) 0 - 20 ng/L   Troponin I, High Sensitivity   Result Value Ref Range    Troponin I, High Sensitivity 58 (HH) 0 - 20 ng/L      Imaging:  CT angio chest for pulmonary embolism    Result Date: 11/26/2024  Interpreted By:  Yang Drake, STUDY: CT ANGIO CHEST FOR PULMONARY EMBOLISM;  11/26/2024 5:15 pm   INDICATION: Signs/Symptoms:SOB.     COMPARISON: None   ACCESSION NUMBER(S): [id number]   ORDERING CLINICIAN: STEFFEN SIMERLINK   TECHNIQUE: Contiguous axial images of the chest were obtained after the intravenous administration  of 75 mL Omnipaque 350 contrast using angiographic PE protocol. Coronal and sagittal reformatted images were reconstructed from the axial data. MIP images were created on an independent workstation and reviewed.   FINDINGS: PULMONARY ARTERIES: Assessment is significantly limited by patient body habitus which results in quantum mottling and streak artifact. Assessment further limited by respiratory motion. No definite large central filling defect within the main pulmonary artery. Assessment of the lobar, segmental, and subsegmental arteries suboptimal.  Main pulmonary artery is enlarged at 3.5 cm. No CT evidence of right heart strain.   HEART: Cardiomegaly. No significant coronary artery calcifications. No significant pericardial effusion.   VESSELS: The ascending thoracic aorta is at the upper limits of normal in size measuring 3.9 cm. No appreciable dissection on limited assessment. No significant aortic atherosclerosis.   MEDIASTINUM AND LYMPH NODES: Visualized thyroid is within normal limits. No enlarged intrathoracic or axillary lymph nodes by imaging criteria. No pneumomediastinum. The esophagus appears within normal limits.   LUNG, AIRWAYS, AND PLEURA: Trachea and proximal mainstem bronchi are patent. Assessment of the pulmonary parenchyma limited due to respiratory motion and additional artifact as above. Small right pleural effusion and volume loss. No pneumothorax.   OSSEOUS STRUCTURES: No acute osseous abnormality.   CHEST WALL SOFT TISSUES: No discernible abnormality.   UPPER ABDOMEN/OTHER: No acute abnormality.       1. Limited examination with suboptimal assessment of the lobar, segmental, and subsegmental arteries. No large central filling defect within the main pulmonary arteries. 2. Enlargement of the main pulmonary artery which may be seen in the setting of pulmonary arterial hypertension. 3. Cardiomegaly. 4. Small right pleural effusion and adjacent atelectasis. 5. Borderline aneurysmal dilatation  of the ascending thoracic aorta measuring up to 3.9 cm. Continued surveillance recommended.   MACRO: None   Signed by: Yang Drake 11/26/2024 6:36 PM Dictation workstation:   [id number]    XR chest 1 view    Result Date: 11/26/2024  Interpreted By:  Shyam Mariscal, STUDY: XR CHEST 1 VIEW;  11/26/2024 3:17 pm   INDICATION: Signs/Symptoms:SOB.     COMPARISON: None.   ACCESSION NUMBER(S): [id number]   ORDERING CLINICIAN: STEFFEN SIMERLINK   FINDINGS:         CARDIOMEDIASTINAL SILHOUETTE: Cardiomediastinal silhouette is mildly enlarged.   LUNGS: Lungs are clear. There is no consolidation or effusion   ABDOMEN: No remarkable upper abdominal findings.   BONES: No acute osseous changes.       1. Mild enlargement of the cardiac silhouette. No acute abnormality       MACRO: None   Signed by: Shyam Mariscal 11/26/2024 3:20 PM Dictation workstation:   [id number]      Assessment/Plan   Acute on chronic systolic CHF  Plan:  Metoprolol XL 50 mg daily  Entresto 24-26 1 tablet twice daily  Lasix 40 mg IV every 12 hours  Daily weight  Telemetry  Cardiology    Hypertension  Plan:  Metoprolol XL 50 mg orally daily  Spironolactone 25 mg orally daily  Lasix 40 mg IV every 12 hours    Paroxysmal atrial fibrillation/atrial flutter  Plan:  Toprol-XL 50 mg orally daily for rate control  Warfarin 5 mg orally daily  Daily INR  Goal INR 2-3    Alcohol use disorder  Will order thiamine 100 mg orally daily folic acid 1 mg orally daily and multivitamin 1 orally daily  Watch for signs and symptoms of withdrawal if so then start CIWA    Type 2 diabetes mellitus  Lispro per corrective scale  Jardiance 10 mg orally daily  He is supposed to be on metformin.  Will hold for now  Plan:    Elevated troponin flat trend  No ACS    Morbid obesity BMI of 43  Lifestyle modification     DVT prophylaxis  Covered with therapeutic warfarin  SCDs    I spent 60 minutes in the professional and overall care of this patient.      Aaron Ramos,  DO

## 2024-11-27 NOTE — ANESTHESIA PROCEDURE NOTES
Peripheral IV  Date/Time: 11/27/2024 2:00 PM      Placement  Needle size: 18 G  Laterality: right  Location: hand  Local anesthetic: none  Site prep: alcohol  Technique: anatomical landmarks  Attempts: 1

## 2024-11-27 NOTE — PROGRESS NOTES
11/27/24 0900   Discharge Planning   Living Arrangements Family members   Support Systems Family members   Assistance Needed none   Type of Residence Private residence   Home or Post Acute Services None   Expected Discharge Disposition Home   Does the patient need discharge transport arranged? Yes   RoundTrip coordination needed? Yes   Financial Resource Strain   How hard is it for you to pay for the very basics like food, housing, medical care, and heating? Not hard   Housing Stability   In the last 12 months, was there a time when you were not able to pay the mortgage or rent on time? N   At any time in the past 12 months, were you homeless or living in a shelter (including now)? N   Transportation Needs   In the past 12 months, has lack of transportation kept you from medical appointments or from getting medications? no   In the past 12 months, has lack of transportation kept you from meetings, work, or from getting things needed for daily living? No     Plan per Medical/Surgical team: cardio consult, IV lasix, daily weights  Status: inpatient  Payor source: no insurance  Discharge disposition: Home  Potential Barriers: ETOH use. INR, med compliance,  ADOD: 1-2 days    Patient has not been taking his medications regularly.  Patient was set up in March 2024 for a coumadin clinic. Patient has not been going to appointments.  Care is usually through [facility].

## 2024-11-27 NOTE — ANESTHESIA PREPROCEDURE EVALUATION
"Patient: Haider Ruiz    Procedure Information       Date/Time: 11/27/24 1330    Scheduled providers: Mark Hopkins MD    Procedure: TRANSESOPHAGEAL ECHO (LUCINA) W/ POSSIBLE CARDIOVERSION    Location: [facility]; [facility]        HPI: 39 year old male presenting for LUCINA. History significant for NESTOR, HFrEF, PAH, obesity    Came to hospital with worsening dyspnea and orthopnea. PE scan negative. Now presenting with arrythmia, to have heart evaluated with LUCINA and possible cardioversion    Anesthesia history: no issues    Visit Vitals  /77   Pulse 87   Temp 36.4 °C (97.5 °F) (Temporal)   Resp 18   Ht 1.88 m (6' 2\")   Wt (!) 197 kg (433 lb 10.3 oz)   SpO2 97%   BMI 55.68 kg/m²   Smoking Status Never   BSA 3.21 m²        [unfilled]      No results found for this or any previous visit from the past 1095 days.   CT PE:  IMPRESSION:  1. Limited examination with suboptimal assessment of the lobar,  segmental, and subsegmental arteries. No large central filling defect  within the main pulmonary arteries.  2. Enlargement of the main pulmonary artery which may be seen in the  setting of pulmonary arterial hypertension.  3. Cardiomegaly.  4. Small right pleural effusion and adjacent atelectasis.  5. Borderline aneurysmal dilatation of the ascending thoracic aorta  measuring up to 3.9 cm. Continued surveillance recommended.    Encounter Date: 11/26/24   ECG 12 lead   Result Value    Ventricular Rate 138    Atrial Rate 138    LA Interval 138    QRS Duration 98    QT Interval 374    QTC Calculation(Bazett) 566    P Axis 76    R Axis 51    T Axis 3    QRS Count 23    Q Onset 218    P Onset 149    P Offset 179    T Offset 405    QTC Fredericia 493    Narrative    Sinus tachycardia  Incomplete right bundle branch block  Anteroseptal infarct , age undetermined  Abnormal ECG  No previous ECGs available  See ED provider note for full interpretation and clinical correlation  Confirmed by Serjio, " Denise ([id number]) on 11/27/2024 11:53:14 AM        Stress test: none      Relevant Problems   Cardiac   (+) Acute on chronic congestive heart failure, unspecified heart failure type       Clinical information reviewed:   Tobacco  Allergies  Meds  Problems  Med Hx  Surg Hx   Fam Hx  Soc   Hx        NPO Detail:  NPO/Void Status  Date of Last Liquid: 11/27/24  Time of Last Liquid: 0600  Date of Last Solid: 11/26/24  Time of Last Solid: 2200         Physical Exam    Airway  Mallampati: II  TM distance: >3 FB  Neck ROM: full     Cardiovascular   Rhythm: regular  Rate: normal     Dental - normal exam     Pulmonary   (+) decreased breath sounds     Abdominal   (+) obese  Abdomen: soft             Anesthesia Plan    History of general anesthesia?: yes  History of complications of general anesthesia?: no    ASA 4     MAC and general     The patient is not a current smoker.    intravenous induction   Anesthetic plan and risks discussed with patient.  Use of blood products discussed with patient who consented to blood products.    Plan discussed with [facility].

## 2024-11-27 NOTE — CARE PLAN
The clinical goals for the shift include vitals within normal range    Problem: Pain - Adult  Goal: Verbalizes/displays adequate comfort level or baseline comfort level  Outcome: Progressing     Problem: Safety - Adult  Goal: Free from fall injury  Outcome: Progressing     Problem: Discharge Planning  Goal: Discharge to home or other facility with appropriate resources  Outcome: Progressing     Problem: Heart Failure  Goal: Reduction in peripheral edema within 24 hours  Outcome: Progressing  Goal: Report improvement of dyspnea/breathlessness this shift  Outcome: Progressing     Problem: Fall/Injury  Goal: Not fall by end of shift  Outcome: Progressing  Goal: Be free from injury by end of the shift  Outcome: Progressing  Goal: Pace activities to prevent fatigue by end of the shift  Outcome: Progressing

## 2024-11-27 NOTE — PROGRESS NOTES
For full history, physical exam, and prior hospital course, please see previous ED provider note. This is in addition to the primary record.    Patient received in sign out from prior provider team pending hospital admission.    ED Course as of 11/26/24 2222 Tue Nov 26, 2024   1448 External chart review:  DC summary Feb 2024  Admitted for  HTNive emergency     [SS]   1504 ECG 12 lead  I have personally reviewed and interpreted this EKG.  Sinus tachycardia, rate 138 BPM  Normal axis  HI interval and QRS duration within normal limits.  QTc within normal limits.  No signs of acute ischemia or infarction [SS]   1518 XR chest 1 view  I have personally reviewed and interpreted this CXR, which shows enlarged cardiac silhouette, no PTX or PNA. Final decision making pending radiology read.   [SS]   1523 XR chest 1 view  Radiology read:  IMPRESSION:  1. Mild enlargement of the cardiac silhouette. No acute abnormality   [SS]   1602 CBC and Auto Differential(!)  anemia, no leukocytosis or thrombocytopenia [SS]   1627 Basic metabolic panel(!)  Hyperglycemia without evidence of DKA, slight hyponatremia, no ROXY [SS]   1628 BNP(!): 352  Mild elevation [SS]   1641 Troponin I, High Sensitivity(!!): 55  Mild elevation will trend [SS]   1839 CT angio chest for pulmonary embolism  Radiology read:  IMPRESSION:  1. Limited examination with suboptimal assessment of the lobar,  segmental, and subsegmental arteries. No large central filling defect  within the main pulmonary arteries.  2. Enlargement of the main pulmonary artery which may be seen in the  setting of pulmonary arterial hypertension.  3. Cardiomegaly.  4. Small right pleural effusion and adjacent atelectasis.  5. Borderline aneurysmal dilatation of the ascending thoracic aorta  measuring up to 3.9 cm. Continued surveillance recommended.   [SS]   1853 Troponin I, High Sensitivity(!!): 61  Slight uptrend in setting of CHFe will cont to trend [SS]   2128 ECG read and interpreted  by me.  Atrial flutter with variable AV block, rate 102.  Normal axis.  Normal intervals.  No ST or T wave derangements. [CG]   2222 Patient was accepted to the IM service by Dr. Tavares. [CG]      ED Course User Index  [CG] Graciela Roach MD  [SS] Steffen Simerlink, MD         Diagnoses as of 11/26/24 2222   Acute on chronic congestive heart failure, unspecified heart failure type   Pleural effusion   Hypertension, unspecified type   Atrial fibrillation with RVR (Multi)        Graciela Roach MD  EM/IM/Peds    This note was dictated by speech recognition. Minor errors in transcription may be present.

## 2024-11-27 NOTE — ANESTHESIA POSTPROCEDURE EVALUATION
Patient: Haider Ruiz    Procedure Summary       Date: 11/27/24 Room / [facility]: [facility]    Anesthesia Start: 1353 Anesthesia Stop: 1426    Procedure: TRANSESOPHAGEAL ECHO (LUCINA) W/ POSSIBLE CARDIOVERSION Diagnosis:       Atrial fibrillation with RVR (Multi)      (atrial flutter)    Scheduled Providers: Mark Hopkins MD Responsible Provider: Taz Kellogg DO    Anesthesia Type: MAC ASA Status: 4            Anesthesia Type: MAC    Vitals Value Taken Time   /68 11/27/24 1430   Temp 36 11/27/24 1430   Pulse 92 11/27/24 1430   Resp 20 11/27/24 1430   SpO2 97 11/27/24 1430       Anesthesia Post Evaluation    Patient location during evaluation: bedside  Patient participation: complete - patient participated  Level of consciousness: responsive to verbal stimuli  Pain score: 0  Pain management: adequate  Airway patency: patent  Cardiovascular status: acceptable  Respiratory status: acceptable and nasal cannula  Hydration status: acceptable  Postoperative Nausea and Vomiting: none        No notable events documented.

## 2024-11-27 NOTE — NURSING NOTE
Patient being discharged from cath lab holding area via transport by way of hospital bed. Pt transported to room 520. 520 nurse notified. Pt in stable condition.

## 2024-11-27 NOTE — CONSULTS
Inpatient consult to Cardiology  Consult performed by: LESLI Norris-CNP  Consult ordered by: Aaron Ramos DO  Reason for consult: chf        History Of Present Illness:    Haider Ruiz is a 39 y.o. male with past medical history of Morbid obesity, HFrEF (LVEF 35% on echo 2/2024), paroxysmal atrial fibrillation, hypertension, DM, who [place] to [facility] with shortness of breath, and bilateral lower extremity edema.  Cardiology consulted for CHF.      Admission to [facility] 2/2024 where he was first diagnosed with acute heart failure with reduced EF.  He also had episode of atrial fibrillation s/p DCCV.  He was unable to have LHC for ischemic evaluation due to cath lab weight restrictions. Discharge weight was 410 lbs    He unfortunately did not take any medications post discharge.  He presented back to [facility] with shortness of breath and leg swelling.  Denies any chest pain or angina.  No palpitations, syncope, or dizziness.  States he has been stressed, as his father recently passed away.  Both of his legs have been very swollen.    Home CV medications: ( out of all medications for months) : Entresto 24-26 BID, Toprol 25mg daily, spironolactone 25mg daily, empagliflozin 25mg daily, warfarin    NO follow up with cardiology since discharge from [facility] 2/2024    Surgical History  Tobacco no  Illicit drug use no  Yes alcohol usually continue 3 times a week about 20 Oz    Past Cardiology Tests (Last 3 Years):    EKG:  Atrial flutter, rate 135      Echo:    ECHO 2/28/2024  The left ventricle is normal in size. There is moderate left ventricular   hypertrophy. Left ventricular systolic function is moderately decreased. EF = 35 ±    5% (2D biplane) Definity contrast used for endocardial border detection. Left   ventricular diastolic function was not evaluated due to >2+ MR.   - Right ventricular systolic function is mildly decreased.   - The right atrial cavity is mildly dilated.   - The visualized aorta is  "dilated with a maximal dimension of 4.1 cm.   - There is moderate (2+) holosystolic mitral valve regurgitation due to restricted    leaflet motion.   - There is moderate (2+) tricuspid valve regurgitation.   - The patient has not had a prior CC echocardiographic exam for comparison. HR~128    at the time of study.    Cath:    Stress Test:    Cardiac Imaging:      Past Medical History:  He has no past medical history on file.    Past Surgical History:  He has no past surgical history on file.      Social History:  He reports that he has never smoked. He has never used smokeless tobacco. No history on file for alcohol use and drug use.    Family History:  No family history on file.     Allergies:  Patient has no known allergies.    ROS:  10 point review of systems including (Constitutional, Eyes, ENMT, Respiratory, Cardiac, Gastrointestinal, Neurological, Psychiatric, and Hematologic) was performed and is otherwise negative.    Objective Data:  Last Recorded Vitals:  Vitals:    24 2216 24 0024 24 0550 24 0700   BP:  (!) 135/91     BP Location:  Left arm     Patient Position:  Lying     Pulse:  (!) 134 (!) 131    Resp:  20     Temp:  36.3 °C (97.3 °F)     TempSrc:  Temporal     SpO2:  100%     Weight: (!) 197 kg (433 lb 10.3 oz) (!) 197 kg (433 lb 10.3 oz)  (!) 197 kg (433 lb 10.3 oz)   Height:  1.88 m (6' 2\")       Medical Gas Therapy: None (Room air)  Weight  Av kg (410 lb 3.7 oz)  Min: 154 kg (340 lb)  Max: 197 kg (433 lb 10.3 oz)    LABS:  CMP:  Results from last 7 days   Lab Units 24  0445 24  1542   SODIUM mmol/L 133* 133*   POTASSIUM mmol/L 3.4* 4.1   CHLORIDE mmol/L 99 100   CO2 mmol/L    ANION GAP mmol/L 10 10   BUN mg/dL 9 8   CREATININE mg/dL 0.94 0.86   EGFR mL/min/1.73m*2 >90 >90     CBC:  Results from last 7 days   Lab Units 24  0445 24  1542   WBC AUTO x10*3/uL 9.1 8.4   HEMOGLOBIN g/dL 12.2* 12.9*   HEMATOCRIT % 36.7* 38.2*   PLATELETS AUTO " "x10*3/uL 240 240   MCV fL 96 94     COAG:   Results from last 7 days   Lab Units 11/27/24  0445 11/26/24  1542   INR  1.6* 1.4*     ABO: No results found for: \"ABO\"  HEME/ENDO:     CARDIAC:   Results from last 7 days   Lab Units 11/26/24 2014 11/26/24  1803 11/26/24  1542   TROPHS ng/L 58* 61* 55*   BNP pg/mL  --   --  352*             Last I/O:    Intake/Output Summary (Last 24 hours) at 11/27/2024 0738  Last data filed at 11/27/2024 0434  Gross per 24 hour   Intake 2700 ml   Output --   Net 2700 ml     Net IO Since Admission: 2,700 mL [11/27/24 0738]      Imaging Results:      Inpatient Medications:  Scheduled medications   Medication Dose Route Frequency    folic acid  1 mg oral Daily    furosemide  40 mg intravenous q12h    insulin lispro  0-10 Units subcutaneous TID AC    metoprolol succinate XL  50 mg oral Daily    multivitamin with minerals  1 tablet oral Daily    pantoprazole  40 mg oral Daily before breakfast    Or    pantoprazole  40 mg intravenous Daily before breakfast    sacubitriL-valsartan  1 tablet oral BID    spironolactone  25 mg oral Daily    thiamine  100 mg oral Daily     PRN medications   Medication    acetaminophen    Or    acetaminophen    Or    acetaminophen    dextrose    dextrose    glucagon    glucagon    metoprolol    ondansetron    Or    ondansetron     Continuous Medications   Medication Dose Last Rate       Outpatient Medications:  Current Outpatient Medications   Medication Instructions    empagliflozin (JARDIANCE) 25 mg, Daily RT    furosemide (LASIX) 40 mg, Daily RT    metFORMIN (Glucophage) 500 mg tablet 1 tablet, Daily with evening meal    polyethylene glycol (GLYCOLAX, MIRALAX) 17 g, Daily PRN    sacubitriL-valsartan (Entresto) 24-26 mg tablet 1 tablet, 2 times daily    spironolactone (ALDACTONE) 25 mg, Daily RT    warfarin (COUMADIN) 7.5 mg, Daily RT       Physical Exam:    General:  Patient is awake, alert, and oriented.  Patient is in no acute distress.  HEENT:  " Normocephalic.  Moist mucosa.    Neck:  Unable to assess  Cardiovascular:  Regular rate and rhythm.  Normal S1 and S2, no murmurs, rubs or gallops  Pulmonary:  Bilaterally diminished in bases.   Abdomen:  Morbid obesity,  Non-tender.   Non-distended.  Positive bowel sounds.  Lower Extremities:  2+ pedal pulses. ++ 4+ lower extremity edema  Neurologic:  Alert and oriented x3. No focal deficit.   Skin: Skin warm and dry, normal skin turgor.   Psychiatric: Normal affect.       Assessment/Plan     Haider Ruiz is a 39 y.o. male with past medical history of Morbid obesity, HFrEF (LVEF 35% on echo 2/2024), paroxysmal atrial fibrillation, hypertension, DM, who presented to [facility] [place] with shortness of breath, and bilateral lower extremity edema.  Cardiology consulted for CHF.      Assessment:    # Acute on chronic systolic heart failure.     - first diagnosed 2/2024.  He has not taken any medications since hospital discharge 2/2024.    -     - admit weight 433 lbs    -declined life vest in the past.     # Hypertension, uncontrolled    # Paroxsymal atrial flutter     - EKG showing atrial flutter    # DM2    - HbA1C 7.2 in 2/2024  # Morbid obesity  # Medication non adherence    # Elevated troponin.  Low level elevation with a flat trend consistent with a non-MI troponin elevation / chronic non-traumatic myocardial injury in the setting of above. Core measures do not apply.  EKG non ischemic.  There are no significant clinical signs / symptoms or ischemic changes consistent with ACS.    11/27 > CT angio does not show PE.  Volume up, in atrial flutter, room air.     Plan:  - We will obtain a transthoracic echocardiogram for structural evaluation including ejection fraction, assessment of regional wall motion abnormalities or valvular disease, and further evaluation of hemodynamics.  - Diurese:  Increase furosemide to 80mg IV BID.  Strict I/O.  Daily weights.   - Restart GDMT:  Entresto 24-26 BID, Toprol 25mg daily,  spironolactone 25mg daily, empagliflozin 25mg daily  - Agree with warfarin, goal INR 2-3  - We will consult pharmacy for high cost medications.   - Adding TSH, lipid panel, and Hgb A1C to labs    NPO for DCCV today at 1330.    Code Status:  Full Code    Claire Mallory, APRN-CNP    ==========================  Attending note  ==========================  Both the BELLO and I have had a face to face encounter with the patient today. I have examined the patient and edited the documented physical examination as necessary.  I personally reviewed the patient's recent labs, medications, orders, EKGs, and pertinent cardiac imaging.  I have reviewed the BELLO's encounter note, approve the BELLO's documentation and have edited the note to reflect the diagnostic and therapeutic plan.    39-year-old male with a history of morbid obesity, HFrEF (LVEF 35% on 2/2024 echo), paroxysmal atrial fibrillation, hypertension, and diabetes, presents with shortness of breath and bilateral lower extremity edema; cardiology was consulted for CHF management. He was first diagnosed with acute heart failure with reduced EF during a hospitalization at [facility] in 2/2024, where he also experienced atrial fibrillation requiring DCCV but was unable to undergo LHC for ischemic evaluation due to cath lab weight restrictions, with a discharge weight of 410 lbs. Since discharge, he has not been taking any cardiovascular medications (Entresto, Toprol, spironolactone, empagliflozin, warfarin) and has not followed up with cardiology. He denies chest pain, palpitations, syncope, or dizziness but reports significant leg swelling and stress following his father’s recent passing.    Past medical history:  As above.    Medications were reviewed.    Allergies were reviewed.    Vital signs, telemetry, medications, labs, and imaging were reviewed as well.    Physical Exam:    General:  Patient is awake, alert, and oriented.  Patient is in no acute distress.  HEENT:   Normocephalic.  Moist mucosa.    Neck:  Unable to assess  Cardiovascular:  Regular rate and rhythm.  Normal S1 and S2, no murmurs, rubs or gallops  Pulmonary:  Bilaterally diminished in bases.   Abdomen:  Morbid obesity,  Non-tender.   Non-distended.  Positive bowel sounds.  Lower Extremities:  2+ pedal pulses. ++ 4+ lower extremity edema  Neurologic:  Alert and oriented x3. No focal deficit.   Skin: Skin warm and dry, normal skin turgor.   Psychiatric: Normal affect.       Assessment/Plan     Haider Ruiz is a 39 y.o. male with past medical history of Morbid obesity, HFrEF (LVEF 35% on echo 2/2024), paroxysmal atrial fibrillation, hypertension, DM, who presented to [facility] with shortness of breath, and bilateral lower extremity edema.  Cardiology consulted for CHF.      Assessment:    # Acute on chronic systolic heart failure.     - first diagnosed 2/2024.  He has not taken any medications since hospital discharge 2/2024.    -     - admit weight 433 lbs    -declined life vest in the past.     # Hypertension, uncontrolled    # Paroxsymal atrial flutter     - EKG showing atrial flutter    # DM2    - HbA1C 7.2 in 2/2024  # Morbid obesity  # Medication non adherence    # Elevated troponin.  Low level elevation with a flat trend consistent with a non-MI troponin elevation / chronic non-traumatic myocardial injury in the setting of above. Core measures do not apply.  EKG non ischemic.  There are no significant clinical signs / symptoms or ischemic changes consistent with ACS.    11/27 > CT angio does not show PE.  Volume up, in atrial flutter, room air.     Plan:  - We will obtain a transthoracic echocardiogram for structural evaluation including ejection fraction, assessment of regional wall motion abnormalities or valvular disease, and further evaluation of hemodynamics.  - Diurese:  Increase furosemide to 80mg IV BID.  Strict I/O.  Daily weights.   - Restart GDMT:  Entresto 24-26 BID, Toprol 25mg daily,  spironolactone 25mg daily, empagliflozin 25mg daily  - Agree with warfarin, goal INR 2-3  - We will consult pharmacy for high cost medications.   - Adding TSH, lipid panel, and Hgb A1C to labs  -NPO for DCCV today at 1330.    Mark Hopkins MD

## 2024-11-27 NOTE — H&P
History Of Present Illness  Haider Ruiz is a 39 y.o. male presenting with atrial fibrillation, here for LUCINA with possible DCCV. Patient reports he stopped taking all his medications about 4 months ago. PMH includes Morbid obesity, HFrEF (LVEF 35% on echo 2/2024), paroxysmal atrial fibrillation, hypertension     Past Medical History:  History reviewed. No pertinent past medical history.     Past Surgical History:  History reviewed. No pertinent surgical history.       Social History:   reports that he has never smoked. He has never used smokeless tobacco.     Family History:  No family history on file.     Allergies:  No Known Allergies     Home Medications:  Current Outpatient Medications   Medication Instructions    empagliflozin (JARDIANCE) 25 mg, Daily RT    furosemide (LASIX) 40 mg, Daily RT    metFORMIN (Glucophage) 500 mg tablet 1 tablet, Daily with evening meal    polyethylene glycol (GLYCOLAX, MIRALAX) 17 g, Daily PRN    sacubitriL-valsartan (Entresto) 24-26 mg tablet 1 tablet, 2 times daily    spironolactone (ALDACTONE) 25 mg, Daily RT    warfarin (COUMADIN) 7.5 mg, Daily RT       Inpatient Medications:  Scheduled medications   Medication Dose Route Frequency    empagliflozin  10 mg oral Daily    folic acid  1 mg oral Daily    furosemide  80 mg intravenous q12h    insulin lispro  0-10 Units subcutaneous TID AC    metoprolol succinate XL  50 mg oral Daily    multivitamin with minerals  1 tablet oral Daily    pantoprazole  40 mg oral Daily before breakfast    Or    pantoprazole  40 mg intravenous Daily before breakfast    potassium chloride CR  40 mEq oral Once    sacubitriL-valsartan  1 tablet oral BID    spironolactone  25 mg oral Daily    thiamine  100 mg oral Daily     PRN medications   Medication    acetaminophen    Or    acetaminophen    Or    acetaminophen    dextrose    dextrose    glucagon    glucagon    metoprolol    ondansetron    Or    ondansetron     Continuous Medications   Medication Dose  "Last Rate         Review of Systems   Constitutional: Negative.    HENT: Negative.     Eyes: Negative.    Respiratory: Negative.     Cardiovascular: Negative.    Gastrointestinal: Negative.    Endocrine: Negative.    Genitourinary: Negative.    Musculoskeletal: Negative.    Skin: Negative.    Allergic/Immunologic: Negative.    Neurological: Negative.    Hematological: Negative.    Psychiatric/Behavioral: Negative.            Physical Exam  Constitutional:       General: He is awake. He is not in acute distress.     Appearance: He is not ill-appearing.   Cardiovascular:      Rate and Rhythm: Tachycardia present. Rhythm irregularly irregular.      Pulses: Normal pulses.           Radial pulses are 2+ on the right side and 2+ on the left side.        Dorsalis pedis pulses are 2+ on the right side and 2+ on the left side.      Heart sounds: Normal heart sounds. No murmur heard.  Pulmonary:      Effort: Pulmonary effort is normal.      Breath sounds: Normal breath sounds and air entry.   Abdominal:      General: Bowel sounds are normal.      Palpations: Abdomen is soft.      Tenderness: There is no abdominal tenderness.   Musculoskeletal:      Right lower leg: Edema present.      Left lower leg: Edema present.   Skin:     General: Skin is warm and dry.   Neurological:      General: No focal deficit present.      Mental Status: He is alert and oriented to person, place, and time.      GCS: GCS eye subscore is 4. GCS verbal subscore is 5. GCS motor subscore is 6.   Psychiatric:         Mood and Affect: Mood normal.         Behavior: Behavior is cooperative.        Sedation Plan    ASA 3     Mallampati class: III.           NPO since 0000    Last Recorded Vitals  Blood pressure 122/86, pulse (!) 131, temperature 36.3 °C (97.3 °F), temperature source Temporal, resp. rate 18, height 1.88 m (6' 2\"), weight (!) 197 kg (433 lb 10.3 oz), SpO2 97%.         Vitals from the Past 24 Hours  Heart Rate:  [107-138]   Temp:  [36.2 °C " "(97.2 °F)-36.6 °C (97.8 °F)]   Resp:  [18-31]   BP: (108-195)/()   Height:  [188 cm (6' 2\")]   Weight:  [154 kg (340 lb)-197 kg (433 lb 10.3 oz)]   SpO2:  [97 %-100 %]          Relevant Results    Labs    CBC:   Recent Labs     11/27/24 0445 11/26/24  1542   WBC 9.1 8.4   HGB 12.2* 12.9*   HCT 36.7* 38.2*    240   MCV 96 94     BMP/CMP:   Recent Labs     11/27/24  0445 11/26/24  1542   * 133*   K 3.4* 4.1   CL 99 100   BUN 9 8   CREATININE 0.94 0.86   CO2 27 27   CALCIUM 7.9* 8.7   GLUCOSE 133* 161*      Magnesium: No results for input(s): \"MG\" in the last 92477 hours.  Lipid Panel:   Recent Labs     11/27/24 0445   CHOL 98   HDL 19.9   CHHDL 4.9   VLDL 15   TRIG 75   NHDL 78     Cardiac   Results from last 7 days   Lab Units 11/26/24 2014 11/26/24  1803 11/26/24  1542   TROPHS ng/L 58* 61* 55*   BNP pg/mL  --   --  352*      Hemoglobin A1C:   Recent Labs     02/28/24  0551   HGBA1C 7.5*     TSH/ Free T4:   Recent Labs     11/27/24  0445   TSH 2.33     Iron:   Recent Labs     11/26/24  1542   *     Coag:   Results from last 7 days   Lab Units 11/27/24  0445 11/26/24  1542   INR  1.6* 1.4*     ABO: No results found for: \"ABO\"    Past Cardiology Tests (Last 3 Years):    EKG:  Recent Labs     11/26/24  2210   ATRRATE 138   VENTRATE 138   PRINT 138   QRSDUR 98   QTCFRED 493   QTCCALCB 566     Encounter Date: 11/26/24   ECG 12 lead   Result Value    Ventricular Rate 138    Atrial Rate 138    MS Interval 138    QRS Duration 98    QT Interval 374    QTC Calculation(Bazett) 566    P Axis 76    R Axis 51    T Axis 3    QRS Count 23    Q Onset 218    P Onset 149    P Offset 179    T Offset 405    QTC Fredericia 493    Narrative    Sinus tachycardia  Incomplete right bundle branch block  Anteroseptal infarct , age undetermined  Abnormal ECG  No previous ECGs available  See ED provider note for full interpretation and clinical correlation  Confirmed by Denise Bassett ([id number]) on 11/27/2024 " "11:53:14 AM     Echo:  Echocardiogram: No results found for this or any previous visit from the past 1800 days.    Ejection Fractions:  No results found for: \"EF\"  Cath:  Coronary Angiography: No results found for this or any previous visit from the past 1800 days.    Right Heart Cath: No results found for this or any previous visit from the past 1800 days.    Stress Test:  Nuclear:No results found for this or any previous visit from the past 1800 days.    Metabolic Stress: No results found for this or any previous visit from the past 1800 days.    Cardiac Imaging:  Cardiac Scoring: No results found for this or any previous visit from the past 1800 days.    Cardiac MRI: No results found for this or any previous visit from the past 1800 days.         Assessment/Plan  Assessment/Plan   Principal Problem:    Acute on chronic congestive heart failure, unspecified heart failure type    Atrial fibrillation, subtherapeutic INR  -LUCINA with possible DCCV with Dr. Hopkins       NP discussed with Dr. Hopkins regarding plan of care/ discharge plan      I spent 30 minutes in the professional and overall care of this patient.      Dilip Loaiza, APRN-CNP, DNP    "

## 2024-11-27 NOTE — PROGRESS NOTES
Haider Ruiz is a 39 y.o. male admitted for Acute on chronic systolic heart failure . Pharmacy has been consulted for warfarin dosing and monitoring for Atrial Fibrillation/Atrial Flutter with goal INR of 2.0-3.0.     Home regimen   MON TUE WED THR FRI SAT SUN   Dose 7.5  mg 7.5  mg 7.5  mg 7.5  mg 7.5  mg 7.5  mg 7.5  mg   Total weekly dose: 52.5  Source: OP note     Labs  INR: 1.6  Hgb/Hct/Plt  Lab Results   Component Value Date    HGB 12.2 (L) 11/27/2024    HGB 12.9 (L) 11/26/2024        Lab Results   Component Value Date    HCT 36.7 (L) 11/27/2024    HCT 38.2 (L) 11/26/2024        Platelets   Date Value Ref Range Status   11/27/2024 240 150 - 450 x10*3/uL Final   11/26/2024 240 150 - 450 x10*3/uL Final      Warfarin Therapy   Current regimen: resuming home reigmen- it seems like patient was not consistently taking outpatient  Bridging: None needed  Interacting medications: no interacting medications    Dosing History During Current Admission  Date 11/26 11/27    INR 1.4 1.6    Dose  (mg) 5 10      Assessment and Plan  Patient's INR of 1.6 today is Subtherapeutic. Hemoglobin/hematocrit/platelet stable  Warfarin inpatient plan: Give warfarin 10 mg today since INR is subtherapeutic and patient received lower dose yesterday  Monitor s/sx of bleeding including epistaxis, hematuria, unusual bruising, hemoptysis, hematochezia as well as s/sx of stroke including impaired speech, unilateral paralysis, blurry vision.  Pharmacy will continue to monitor the patient and adjust therapy as needed.      Thank you for the consult. Please do not hesitate to contact a pharmacist with any questions.    Caryn Raya, ChristaD

## 2024-11-28 LAB
ANION GAP SERPL CALC-SCNC: 12 MMOL/L (ref 10–20)
BUN SERPL-MCNC: 12 MG/DL (ref 6–23)
CALCIUM SERPL-MCNC: 8.1 MG/DL (ref 8.6–10.3)
CHLORIDE SERPL-SCNC: 102 MMOL/L (ref 98–107)
CO2 SERPL-SCNC: 28 MMOL/L (ref 21–32)
CREAT SERPL-MCNC: 1.05 MG/DL (ref 0.5–1.3)
EGFRCR SERPLBLD CKD-EPI 2021: >90 ML/MIN/1.73M*2
ERYTHROCYTE [DISTWIDTH] IN BLOOD BY AUTOMATED COUNT: 13.3 % (ref 11.5–14.5)
GLUCOSE BLD MANUAL STRIP-MCNC: 116 MG/DL (ref 74–99)
GLUCOSE BLD MANUAL STRIP-MCNC: 144 MG/DL (ref 74–99)
GLUCOSE BLD MANUAL STRIP-MCNC: 149 MG/DL (ref 74–99)
GLUCOSE SERPL-MCNC: 126 MG/DL (ref 74–99)
HCT VFR BLD AUTO: 39 % (ref 41–52)
HGB BLD-MCNC: 12.6 G/DL (ref 13.5–17.5)
INR PPP: 1.6 (ref 0.9–1.1)
MCH RBC QN AUTO: 31.3 PG (ref 26–34)
MCHC RBC AUTO-ENTMCNC: 32.3 G/DL (ref 32–36)
MCV RBC AUTO: 97 FL (ref 80–100)
NRBC BLD-RTO: 0 /100 WBCS (ref 0–0)
PLATELET # BLD AUTO: 252 X10*3/UL (ref 150–450)
POTASSIUM SERPL-SCNC: 4.5 MMOL/L (ref 3.5–5.3)
PROTHROMBIN TIME: 17.7 SECONDS (ref 9.8–12.8)
RBC # BLD AUTO: 4.03 X10*6/UL (ref 4.5–5.9)
SODIUM SERPL-SCNC: 137 MMOL/L (ref 136–145)
WBC # BLD AUTO: 8.6 X10*3/UL (ref 4.4–11.3)

## 2024-11-28 PROCEDURE — 82374 ASSAY BLOOD CARBON DIOXIDE: CPT | Performed by: INTERNAL MEDICINE

## 2024-11-28 PROCEDURE — 2500000001 HC RX 250 WO HCPCS SELF ADMINISTERED DRUGS (ALT 637 FOR MEDICARE OP): Performed by: INTERNAL MEDICINE

## 2024-11-28 PROCEDURE — 85027 COMPLETE CBC AUTOMATED: CPT | Performed by: INTERNAL MEDICINE

## 2024-11-28 PROCEDURE — 1100000001 HC PRIVATE ROOM DAILY

## 2024-11-28 PROCEDURE — 82947 ASSAY GLUCOSE BLOOD QUANT: CPT

## 2024-11-28 PROCEDURE — 2500000004 HC RX 250 GENERAL PHARMACY W/ HCPCS (ALT 636 FOR OP/ED): Performed by: NURSE PRACTITIONER

## 2024-11-28 PROCEDURE — 85610 PROTHROMBIN TIME: CPT

## 2024-11-28 PROCEDURE — 99232 SBSQ HOSP IP/OBS MODERATE 35: CPT

## 2024-11-28 PROCEDURE — 2500000001 HC RX 250 WO HCPCS SELF ADMINISTERED DRUGS (ALT 637 FOR MEDICARE OP): Performed by: STUDENT IN AN ORGANIZED HEALTH CARE EDUCATION/TRAINING PROGRAM

## 2024-11-28 PROCEDURE — 2500000002 HC RX 250 W HCPCS SELF ADMINISTERED DRUGS (ALT 637 FOR MEDICARE OP, ALT 636 FOR OP/ED)

## 2024-11-28 PROCEDURE — 2500000002 HC RX 250 W HCPCS SELF ADMINISTERED DRUGS (ALT 637 FOR MEDICARE OP, ALT 636 FOR OP/ED): Performed by: STUDENT IN AN ORGANIZED HEALTH CARE EDUCATION/TRAINING PROGRAM

## 2024-11-28 PROCEDURE — 2500000001 HC RX 250 WO HCPCS SELF ADMINISTERED DRUGS (ALT 637 FOR MEDICARE OP): Performed by: NURSE PRACTITIONER

## 2024-11-28 PROCEDURE — 36415 COLL VENOUS BLD VENIPUNCTURE: CPT | Performed by: INTERNAL MEDICINE

## 2024-11-28 PROCEDURE — 99232 SBSQ HOSP IP/OBS MODERATE 35: CPT | Performed by: INTERNAL MEDICINE

## 2024-11-28 RX ORDER — LANOLIN ALCOHOL/MO/W.PET/CERES
100 CREAM (GRAM) TOPICAL DAILY
Qty: 30 TABLET | Refills: 0 | Status: CANCELLED | OUTPATIENT
Start: 2024-11-29 | End: 2024-12-29

## 2024-11-28 RX ORDER — SPIRONOLACTONE 25 MG/1
25 TABLET ORAL
Qty: 30 TABLET | Refills: 0 | Status: CANCELLED | OUTPATIENT
Start: 2024-11-28 | End: 2024-12-28

## 2024-11-28 RX ORDER — METOPROLOL SUCCINATE 50 MG/1
50 TABLET, EXTENDED RELEASE ORAL DAILY
Qty: 30 TABLET | Refills: 0 | Status: CANCELLED | OUTPATIENT
Start: 2024-11-29 | End: 2024-12-29

## 2024-11-28 RX ORDER — WARFARIN SODIUM 5 MG/1
10 TABLET ORAL ONCE
Status: COMPLETED | OUTPATIENT
Start: 2024-11-28 | End: 2024-11-28

## 2024-11-28 RX ORDER — TORSEMIDE 20 MG/1
20 TABLET ORAL DAILY
Status: DISCONTINUED | OUTPATIENT
Start: 2024-11-29 | End: 2024-11-29 | Stop reason: HOSPADM

## 2024-11-28 RX ORDER — FOLIC ACID 1 MG/1
1 TABLET ORAL DAILY
Qty: 30 TABLET | Refills: 0 | Status: CANCELLED | OUTPATIENT
Start: 2024-11-29 | End: 2024-12-29

## 2024-11-28 RX ORDER — METFORMIN HYDROCHLORIDE 500 MG/1
500 TABLET ORAL
Qty: 30 TABLET | Refills: 0 | Status: CANCELLED | OUTPATIENT
Start: 2024-11-28 | End: 2024-12-28

## 2024-11-28 RX ORDER — TORSEMIDE 20 MG/1
20 TABLET ORAL DAILY
Qty: 30 TABLET | Refills: 0 | Status: CANCELLED | OUTPATIENT
Start: 2024-11-29 | End: 2024-12-29

## 2024-11-28 ASSESSMENT — PAIN SCALES - GENERAL
PAINLEVEL_OUTOF10: 0 - NO PAIN

## 2024-11-28 ASSESSMENT — COGNITIVE AND FUNCTIONAL STATUS - GENERAL
MOBILITY SCORE: 24
DAILY ACTIVITIY SCORE: 24

## 2024-11-28 NOTE — PROGRESS NOTES
Subjective Data:  Evaluated the patient, who remains asymptomatic from a cardiovascular standpoint. He is in sinus rhythm, without chest pain, palpitations, or dyspnea. Diuresis is positive, and blood pressure remains within the controlled range.    Overnight Events:    None     Objective Data:  Last Recorded Vitals:  Vitals:    11/28/24 0049 11/28/24 0459 11/28/24 0600 11/28/24 0730   BP: 121/89 136/88  131/90   BP Location: Left arm Left arm  Right arm   Patient Position: Lying Lying  Sitting   Pulse:  88  91   Resp:  13  20   Temp: 36.4 °C (97.5 °F) 36.3 °C (97.3 °F)  35.9 °C (96.7 °F)   TempSrc: Axillary Axillary  Temporal   SpO2: 97% 97%  96%   Weight:   (!) 191 kg (422 lb 2.9 oz)    Height:           Last Labs:  CBC - 11/28/2024:  5:16 AM  8.6 12.6 252    39.0      CMP - 11/28/2024:  5:16 AM  8.1 _ _ --- _   _ _ _ _      PTT - No results in last year.  1.6   17.7 _     TROPHS   Date/Time Value Ref Range Status   11/26/2024 08:14 PM 58 0 - 20 ng/L Final     Comment:     Previous result verified on 11/26/2024 1638 on specimen/case [id number] called with component TRPHS for procedure Troponin I, High Sensitivity, Initial with value 55 ng/L.   11/26/2024 06:03 PM 61 0 - 20 ng/L Final     Comment:     Previous result verified on 11/26/2024 1638 on specimen/case [id number] called with component TRPHS for procedure Troponin I, High Sensitivity, Initial with value 55 ng/L.   11/26/2024 03:42 PM 55 0 - 20 ng/L Final     BNP   Date/Time Value Ref Range Status   11/26/2024 03:42  0 - 99 pg/mL Final     HGBA1C   Date/Time Value Ref Range Status   11/27/2024 04:45 AM 7.6 See comment % Final   02/28/2024 05:51 AM 7.5 4.3 - 5.6 % Final     Comment:     American Diabetes Association guidelines indicate that patients with HgbA1c in the range 5.7-6.4% are at increased risk for development of diabetes, and intervention by lifestyle modification may be beneficial. HgbA1c greater or equal to 6.5% is considered  "diagnostic of diabetes.     LDLCALC   Date/Time Value Ref Range Status   11/27/2024 04:45 AM 63 <=99 mg/dL Final     Comment:                                 Near   Borderline      AGE      Desirable  Optimal    High     High     Very High     0-19 Y     0 - 109     ---    110-129   >/= 130     ----    20-24 Y     0 - 119     ---    120-159   >/= 160     ----      >24 Y     0 -  99   100-129  130-159   160-189     >/=190       VLDL   Date/Time Value Ref Range Status   11/27/2024 04:45 AM 15 0 - 40 mg/dL Final      Last I/O:  I/O last 3 completed shifts:  In: 3190 (16.7 mL/kg) [P.O.:2940; IV Piggyback:250]  Out: 1060 (5.5 mL/kg) [Urine:1060 (0.2 mL/kg/hr)]  Weight: 191.5 kg     Past Cardiology Tests (Last 3 Years):  EKG:  ECG 12 lead 11/26/2024    Echo:  Transesophageal Echo (LUCINA) With Possible Cardioversion 11/27/2024    Ejection Fractions:  No results found for: \"EF\"  Cath:  No results found for this or any previous visit from the past 1095 days.    Stress Test:  No results found for this or any previous visit from the past 1095 days.    Cardiac Imaging:  No results found for this or any previous visit from the past 1095 days.      Inpatient Medications:  Scheduled medications   Medication Dose Route Frequency    empagliflozin  10 mg oral Daily    folic acid  1 mg oral Daily    furosemide  80 mg intravenous q12h    insulin lispro  0-10 Units subcutaneous TID AC    lidocaine  0.1 mL subcutaneous Once    metoprolol succinate XL  50 mg oral Daily    multivitamin with minerals  1 tablet oral Daily    oxygen   inhalation Continuous - Inhalation    pantoprazole  40 mg oral Daily before breakfast    Or    pantoprazole  40 mg intravenous Daily before breakfast    perflutren lipid microspheres  0.5-10 mL of dilution intravenous Once in imaging    perflutren protein A microsphere  0.5 mL intravenous Once in imaging    sacubitriL-valsartan  1 tablet oral BID    spironolactone  25 mg oral Daily    sulfur hexafluoride microsphr "  2 mL intravenous Once in imaging    thiamine  100 mg oral Daily    warfarin  10 mg oral Once     PRN medications   Medication    acetaminophen    Or    acetaminophen    Or    acetaminophen    dextrose    dextrose    glucagon    glucagon    labetaloL    metoprolol    ondansetron    Or    ondansetron    ondansetron    promethazine     Continuous Medications   Medication Dose Last Rate       Physical Exam:  General:  Patient is awake, alert, and oriented.  Patient is in no acute distress.  HEENT:  Normocephalic.  Moist mucosa.    Neck:  Unable to assess  Cardiovascular:  Regular rate and rhythm.  Normal S1 and S2, no murmurs, rubs or gallops  Pulmonary:  Bilaterally diminished in bases.   Abdomen:  Morbid obesity,  Non-tender.   Non-distended.  Positive bowel sounds.  Lower Extremities:  2+ pedal pulses. ++ 2 lower extremity edema  Neurologic:  Alert and oriented x3. No focal deficit.   Skin: Skin warm and dry, normal skin turgor.   Psychiatric: Normal affect.        Assessment/Plan    Haider Ruiz is a 39 y.o. male with past medical history of Morbid obesity, HFrEF (LVEF 35% on echo 2/2024), paroxysmal atrial fibrillation, hypertension, DM, who presented to [facility] with shortness of breath, and bilateral lower extremity edema.  Cardiology consulted for CHF.      39-year-old male with a history of morbid obesity, HFrEF (LVEF 35% on 2/2024 echo), paroxysmal atrial fibrillation, hypertension, and diabetes, presents with shortness of breath and bilateral lower extremity edema; cardiology was consulted for CHF management. He was first diagnosed with acute heart failure with reduced EF during a hospitalization at [facility] in 2/2024, where he also experienced atrial fibrillation requiring DCCV but was unable to undergo LHC for ischemic evaluation due to cath lab weight restrictions, with a discharge weight of 410 lbs. Since discharge, he has not been taking any cardiovascular medications (Entresto, Toprol, spironolactone,  empagliflozin, warfarin) and has not followed up with cardiology. He denies chest pain, palpitations, syncope, or dizziness but reports significant leg swelling and stress following his father’s recent passing.     Successful cardioversion performed on 11/27/24.     Assessment:     # Acute on chronic systolic heart failure.     - first diagnosed 2/2024.  He has not taken any medications since hospital discharge 2/2024.    -     - admit weight 433 lbs    -declined life vest in the past.      # Hypertension, uncontrolled     # Paroxsymal atrial flutter     - EKG showing atrial flutter     -Successful cardioversion performed on 11/27/24.     # DM2    - HbA1C 7.2 in 2/2024    - HbA1c 7.6 in 11/24  # Morbid obesity  # Medication non adherence     # Elevated troponin.  Low level elevation with a flat trend consistent with a non-MI troponin elevation / chronic non-traumatic myocardial injury in the setting of above. Core measures do not apply.  EKG non ischemic.  There are no significant clinical signs / symptoms or ischemic changes consistent with ACS.     11/27 > CT angio does not show PE.  Volume up, in atrial flutter, room air.   11/28 > Clinically improved - Successful cardioversion performed on 11/27/24.     Plan:  - Can be transitioned to oral Torsemide 20mg once a day plus 20mg PRN  - Continue GDMT:  Entresto 24-26 BID, Toprol 25mg daily, spironolactone 25mg daily, empagliflozin 25mg daily  - Agree with warfarin, goal INR 2-3  - Diabetes therapy optimization by primary team  - Bariatric surgery evaluation as an outpatient  - Sleep medicine evaluation as an outpatient for possible NESTOR  - Follow up with cardiology as an outpatient  - Has to complete transthoracic echocardiogram as an outpatient  - No barriers to discharge from a cardiological standpoint.     Code Status:  Full Code    Mark Hopkins MD

## 2024-11-28 NOTE — PROGRESS NOTES
Haider Ruiz is a 39 y.o. male admitted for Acute on chronic systolic heart failure . Pharmacy has been consulted for warfarin dosing and monitoring for Atrial Fibrillation/Atrial Flutter with goal INR of 2.0-3.0.     Home regimen   MON TUE WED THR FRI SAT SUN   Dose 7.5  mg 7.5  mg 7.5  mg 7.5  mg 7.5  mg 7.5  mg 7.5  mg   Total weekly dose: 52.5  Source: OP note     Labs  INR: 1.6  Hgb/Hct/Plt  Lab Results   Component Value Date    HGB 12.6 (L) 11/28/2024    HGB 12.2 (L) 11/27/2024    HGB 12.9 (L) 11/26/2024        Lab Results   Component Value Date    HCT 39.0 (L) 11/28/2024    HCT 36.7 (L) 11/27/2024    HCT 38.2 (L) 11/26/2024        Platelets   Date Value Ref Range Status   11/28/2024 252 150 - 450 x10*3/uL Final   11/27/2024 240 150 - 450 x10*3/uL Final   11/26/2024 240 150 - 450 x10*3/uL Final      Warfarin Therapy   Current regimen: resuming home reigmen- it seems like patient was not consistently taking outpatient  Bridging: None needed  Interacting medications: no interacting medications    Dosing History During Current Admission  Date 11/26 11/27 11/28   INR 1.4 1.6 1.6   Dose  (mg) 5 10 10     Assessment and Plan  Patient's INR of 1.6 today is Subtherapeutic. Hemoglobin/hematocrit/platelet stable  Warfarin inpatient plan: Give warfarin 10 mg today   Monitor s/sx of bleeding including epistaxis, hematuria, unusual bruising, hemoptysis, hematochezia as well as s/sx of stroke including impaired speech, unilateral paralysis, blurry vision.  Pharmacy will continue to monitor the patient and adjust therapy as needed.      Thank you for the consult. Please do not hesitate to contact a pharmacist with any questions.    Caryn Raya, PharmD

## 2024-11-28 NOTE — CARE PLAN
The patient's goals for the shift include      The clinical goals for the shift include vitals within normal range      Problem: Safety - Adult  Goal: Free from fall injury  Outcome: Progressing     Problem: Heart Failure  Goal: Reduction in peripheral edema within 24 hours  Outcome: Progressing  Goal: Report improvement of dyspnea/breathlessness this shift  Outcome: Progressing

## 2024-11-28 NOTE — PROGRESS NOTES
Naya Ruiz is a 39 y.o. male on day 1 of admission presenting with Acute on chronic congestive heart failure, unspecified heart failure type.      Subjective   Patient was seen and examined bedside this morning, states that he slept well, and denies having any symptom at that time.       Objective     Last Recorded Vitals  /72   Pulse 93   Temp 36.4 °C (97.5 °F) (Temporal)   Resp 16   Wt (!) 197 kg (433 lb 10.3 oz)   SpO2 100%   Intake/Output last 3 Shifts:    Intake/Output Summary (Last 24 hours) at 11/27/2024 1952  Last data filed at 11/27/2024 1642  Gross per 24 hour   Intake 3190 ml   Output 1060 ml   Net 2130 ml       Admission Weight  Weight: (!) 154 kg (340 lb) (11/26/24 1436)    Daily Weight  11/27/24 : (!) 197 kg (433 lb 10.3 oz)    Image Results  Transesophageal Echo (LUCINA) With Possible Cardioversion     [facility], [address] [place] [address]               Tel [phone] and Fax [phone]    TRANSESOPHAGEAL ECHOCARDIOGRAM REPORT    WITH DIRECT CURRENT CARDIOVERSION     Patient Name:       NAYA RUIZ      Reading Physician:   [id number] Mark Hopkins MD  Study Date:         11/27/2024          Ordering Provider:   [id number] KURT LOWE  MRN/PID:            [id number]            Fellow:  Accession#:         [id number]        Nurse:               Sammy Fang RN  Date of Birth/Age:  1985 / 39      Sonographer:         Tommy Chávez RDCS                      years  Gender assigned at                     Additional Staff:    Alvaro DONNELLY  Birth:  Height:             187.96 cm           Admit Date:          11/26/2024  Weight:             196.41 kg           Admission Status:    Inpatient - Routine  BSA / BMI:          3.02 m2 / 55.59     Encounter#:           [id number]                      kg/m2  Blood Pressure:     127/85 mmHg         Department Location: [facility] Cath Lab    Study Type:    TRANSESOPHAGEAL ECHO (LUCINA) W/ POSSIBLE CARDIOVERSION  Diagnosis/ICD: Unspecified atrial fibrillation-I48.91  Indication:    A-fib with RVR  CPT Code:      Doppler Limited-99190; LUCINA Complete-62141; Color Doppler-73231    Patient History:  Drug Allergies:    None  Smoker:            Never.  Diabetes:          Yes  Pertinent History: HTN, CHF, Dyspnea and A-Fib. 39 y.o. male presents for                     LUCINA/CVN for A-fib with RVR.    Study Detail: The following Echo studies were performed: 2D, Doppler and 3D.                Patient's heart rhythm is atrial fibrillation. A bubble study was                not performed. The patient was sedated.       PHYSICIAN INTERPRETATION:  LUCINA Details: The LUCINA probe used was [id number]. Technically adequate omniplane transesophageal echocardiogram performed. Color flow Doppler echo was performed to assess for the presence of a patent foramen ovale.  LUCINA Medication: The pharynx was anesthetized with Cetacaine spray and viscous lidocaine. The patient was sedated by Anesthesia; please refer to anesthesia flow sheet for medications used.  LUCINA Procedure: The probe was passed without difficulty. Complications encountered during procedure: Patient tolerated the procedure well without any apparent complications.  LUCINA Cardioversion Procedure:  The patient was placed in a supine position and anterior-posterior  defibrillation patches were applied. A biphasic Zoll defibrillator was attached  to the patient and set to synchronous mode.  One synchronized biphasic external shock was delivered at 200 Joules in attempt  to cardiovert the patient from atrial fibrillation. The procedure was  successful, resulting in normal sinus rhythm.  The patient recovered uneventfully from the effects on conscious and deep  sedation. The procedure was well tolerated. There  were no complications. The  patient was discharged from the Cardiac Cath Lab hemodynamically stable and with  no neurological deficits.    Left Ventricle: The left ventricle was not well visualized. The left ventricular ejection fraction could not be measured. The left ventricular cavity size was not assessed. Left ventricular diastolic filling cannot be determined, due to atrial fibrillation/flutter.  Left Atrium: The left atrium is enlarged. There is a mobile interatrial septum. A bubble study using agitated saline was not performed. The left atrial appendage appears multilobed, there is no thrombus visualized in the left atrial appendage and there is no mass visualized in the left atrial appendage.  Right Ventricle: The right ventricle was not assessed. Right ventricular systolic function not assessed.  Right Atrium: The right atrium was not assessed.  Aortic Valve: The aortic valve is trileaflet. There is mild aortic valve regurgitation.  Mitral Valve: The mitral valve is mildly thickened. There is mild to moderate mitral valve regurgitation which is posteriorly directed.  Tricuspid Valve: The tricuspid valve is structurally normal. There is moderate tricuspid regurgitation. Reported right ventricular systolic pressure may be underestimated due to incomplete or suboptimal Doppler envelope.  Pulmonic Valve: The pulmonic valve is structurally normal. There is no indication of pulmonic valve regurgitation.  Pericardium: There is no pericardial effusion noted.  Aorta: The aortic root is normal. There is mild dilatation of the ascending aorta.  In comparison to the previous echocardiogram(s): There are no prior studies on this patient for comparison purposes.       CONCLUSIONS:   1. The left ventricle was not well visualized. The left ventricular ejection fraction could not be measured.   2. Left ventricular diastolic filling cannot be determined, due to atrial fibrillation/flutter.   3. Successful cardioversion for  atrial fibrillation resulting in normal sinus rhythm.   4. The left atrium is enlarged.   5. Mild to moderate mitral valve regurgitation.   6. Moderate tricuspid regurgitation visualized.   7. Mild aortic valve regurgitation.    QUANTITATIVE DATA SUMMARY:     [id number] Mark Hopkins MD  Electronically signed on 11/27/2024 at 3:34:36 PM       ** Final **  ECG 12 lead  Sinus tachycardia  Incomplete right bundle branch block  Anteroseptal infarct , age undetermined  Abnormal ECG  No previous ECGs available  See ED provider note for full interpretation and clinical correlation  Confirmed by Denise Bassett ([id number]) on 11/27/2024 11:53:14 AM      Physical Exam  Constitutional: Obese gentleman, alert active, cooperative not in acute distress  Eyes: PERRLA, clear sclera  ENMT: Moist mucosal membranes, no exudate  Head / Neck: Atraumatic, normocephalic, supple neck, JVP not visualized  Lungs: Patent airways, CTABL  Heart: RRR, S1S2, no murmurs appreciated, palpable pulses in all extremities  GI: Soft, NT, ND, bowel sounds present in all quadrants  MSK: Moves all extremities freely, no restriction  of ROM, no joint edema  Extremities: Bilateral lower extremities, peripheral edema  : No Beaver catheter inserted  Breast: Deferred  Neurological: AAO x 3 to person, place and date, facial muscles symmetrical, sensation intact, strength 4/4, no acute focal neurological deficits appreciated  Psychological: Appropriate mood and behavior    Relevant Results               Assessment/Plan    39 y.o. male with a past medical history of morbid obesity daily BMI of 43.6, systolic CHF ejection fraction 35%, paroxysmal atrial fibrillation, hypertension, type 2 diabetes mellitus, medical noncompliance and undiagnosed NESTOR who presented to [facility] ER complaining of shortness of breath     Acute on chronic systolic CHF  Metoprolol XL 50 mg daily  Entresto 24-26 1 tablet twice daily  Lasix 40 mg IV every 12 hours  Daily  weight  Telemetry  Cardiology     Hypertension  Plan:  Metoprolol XL 50 mg orally daily  Spironolactone 25 mg orally daily  Lasix 40 mg IV every 12 hours     Paroxysmal atrial fibrillation/atrial flutter  LUCINA DCCV today  Toprol-XL 50 mg orally daily for rate control  Warfarin 5 mg orally daily, will receive 10 mg x 1 today  Daily INR  Goal INR 2-3     Alcohol use disorder  Will order thiamine 100 mg orally daily folic acid 1 mg orally daily and multivitamin 1 orally daily  Watch for signs and symptoms of withdrawal if so then start CIWA     Type 2 diabetes mellitus  Lispro per corrective scale  Jardiance 10 mg orally daily  He is supposed to be on metformin.  Will hold for now     Elevated troponin flat trend  No ACS     Morbid obesity BMI of 43  Lifestyle modification      DVT prophylaxis  Warfarin 10 mg p.o. x 1  SCDs     Disposition: Presenting with heart failure, and atrial fibrillation, need further management, discharge pending clinical stability.      Jaron Peña, DO

## 2024-11-29 ENCOUNTER — PHARMACY VISIT (OUTPATIENT)
Dept: PHARMACY | Facility: CLINIC | Age: 39
End: 2024-11-29
Payer: COMMERCIAL

## 2024-11-29 ENCOUNTER — APPOINTMENT (OUTPATIENT)
Dept: CARDIOLOGY | Facility: HOSPITAL | Age: 39
End: 2024-11-29

## 2024-11-29 VITALS
BODY MASS INDEX: 40.43 KG/M2 | SYSTOLIC BLOOD PRESSURE: 159 MMHG | HEIGHT: 74 IN | RESPIRATION RATE: 17 BRPM | HEART RATE: 94 BPM | OXYGEN SATURATION: 99 % | WEIGHT: 315 LBS | DIASTOLIC BLOOD PRESSURE: 91 MMHG | TEMPERATURE: 96.8 F

## 2024-11-29 LAB
AORTIC VALVE MEAN GRADIENT: 4 MMHG
AORTIC VALVE PEAK VELOCITY: 1.42 M/S
AV PEAK GRADIENT: 8 MMHG
AVA (PEAK VEL): 2.68 CM2
AVA (VTI): 2.73 CM2
EJECTION FRACTION APICAL 4 CHAMBER: 24
EJECTION FRACTION: 36 %
ERYTHROCYTE [DISTWIDTH] IN BLOOD BY AUTOMATED COUNT: 13.3 % (ref 11.5–14.5)
GLUCOSE BLD MANUAL STRIP-MCNC: 120 MG/DL (ref 74–99)
HCT VFR BLD AUTO: 39.6 % (ref 41–52)
HGB BLD-MCNC: 12.7 G/DL (ref 13.5–17.5)
HOLD SPECIMEN: NORMAL
INR PPP: 2 (ref 0.9–1.1)
LEFT ATRIUM VOLUME AREA LENGTH INDEX BSA: 34.8 ML/M2
LEFT VENTRICLE INTERNAL DIMENSION DIASTOLE: 5.65 CM (ref 3.5–6)
LEFT VENTRICULAR OUTFLOW TRACT DIAMETER: 2.1 CM
MCH RBC QN AUTO: 31.1 PG (ref 26–34)
MCHC RBC AUTO-ENTMCNC: 32.1 G/DL (ref 32–36)
MCV RBC AUTO: 97 FL (ref 80–100)
MITRAL VALVE E/A RATIO: 3.98
NRBC BLD-RTO: 0 /100 WBCS (ref 0–0)
PLATELET # BLD AUTO: 232 X10*3/UL (ref 150–450)
PROTHROMBIN TIME: 22.3 SECONDS (ref 9.8–12.8)
RBC # BLD AUTO: 4.09 X10*6/UL (ref 4.5–5.9)
RIGHT VENTRICLE FREE WALL PEAK S': 15.1 CM/S
TRICUSPID ANNULAR PLANE SYSTOLIC EXCURSION: 2.3 CM
WBC # BLD AUTO: 8.7 X10*3/UL (ref 4.4–11.3)

## 2024-11-29 PROCEDURE — 2500000001 HC RX 250 WO HCPCS SELF ADMINISTERED DRUGS (ALT 637 FOR MEDICARE OP): Performed by: STUDENT IN AN ORGANIZED HEALTH CARE EDUCATION/TRAINING PROGRAM

## 2024-11-29 PROCEDURE — 93306 TTE W/DOPPLER COMPLETE: CPT

## 2024-11-29 PROCEDURE — 85610 PROTHROMBIN TIME: CPT

## 2024-11-29 PROCEDURE — 2500000001 HC RX 250 WO HCPCS SELF ADMINISTERED DRUGS (ALT 637 FOR MEDICARE OP): Performed by: INTERNAL MEDICINE

## 2024-11-29 PROCEDURE — 99239 HOSP IP/OBS DSCHRG MGMT >30: CPT | Performed by: INTERNAL MEDICINE

## 2024-11-29 PROCEDURE — 36415 COLL VENOUS BLD VENIPUNCTURE: CPT | Performed by: INTERNAL MEDICINE

## 2024-11-29 PROCEDURE — 2500000002 HC RX 250 W HCPCS SELF ADMINISTERED DRUGS (ALT 637 FOR MEDICARE OP, ALT 636 FOR OP/ED): Performed by: STUDENT IN AN ORGANIZED HEALTH CARE EDUCATION/TRAINING PROGRAM

## 2024-11-29 PROCEDURE — 2500000001 HC RX 250 WO HCPCS SELF ADMINISTERED DRUGS (ALT 637 FOR MEDICARE OP): Performed by: NURSE PRACTITIONER

## 2024-11-29 PROCEDURE — 2500000004 HC RX 250 GENERAL PHARMACY W/ HCPCS (ALT 636 FOR OP/ED): Performed by: NURSE PRACTITIONER

## 2024-11-29 PROCEDURE — 85027 COMPLETE CBC AUTOMATED: CPT | Performed by: INTERNAL MEDICINE

## 2024-11-29 PROCEDURE — 2500000001 HC RX 250 WO HCPCS SELF ADMINISTERED DRUGS (ALT 637 FOR MEDICARE OP)

## 2024-11-29 PROCEDURE — RXMED WILLOW AMBULATORY MEDICATION CHARGE

## 2024-11-29 PROCEDURE — 82947 ASSAY GLUCOSE BLOOD QUANT: CPT

## 2024-11-29 PROCEDURE — 99232 SBSQ HOSP IP/OBS MODERATE 35: CPT

## 2024-11-29 RX ORDER — WARFARIN SODIUM 5 MG/1
7.5 TABLET ORAL EVERY EVENING
Qty: 45 TABLET | Refills: 2 | Status: SHIPPED | OUTPATIENT
Start: 2024-11-29 | End: 2025-02-27

## 2024-11-29 RX ORDER — METFORMIN HYDROCHLORIDE 500 MG/1
500 TABLET ORAL
Qty: 30 TABLET | Refills: 2 | Status: SHIPPED | OUTPATIENT
Start: 2024-11-29 | End: 2025-02-27

## 2024-11-29 RX ORDER — METOPROLOL SUCCINATE 50 MG/1
50 TABLET, EXTENDED RELEASE ORAL DAILY
Qty: 30 TABLET | Refills: 2 | Status: SHIPPED | OUTPATIENT
Start: 2024-11-30 | End: 2025-02-28

## 2024-11-29 RX ORDER — SPIRONOLACTONE 25 MG/1
25 TABLET ORAL
Qty: 30 TABLET | Refills: 2 | Status: SHIPPED | OUTPATIENT
Start: 2024-11-29 | End: 2025-02-27

## 2024-11-29 RX ORDER — TORSEMIDE 20 MG/1
20 TABLET ORAL DAILY
Qty: 30 TABLET | Refills: 2 | Status: SHIPPED | OUTPATIENT
Start: 2024-11-30 | End: 2025-02-28

## 2024-11-29 RX ORDER — LOSARTAN POTASSIUM 50 MG/1
50 TABLET ORAL DAILY
Status: DISCONTINUED | OUTPATIENT
Start: 2024-11-29 | End: 2024-11-29 | Stop reason: HOSPADM

## 2024-11-29 RX ORDER — LOSARTAN POTASSIUM 50 MG/1
50 TABLET ORAL DAILY
Qty: 30 TABLET | Refills: 2 | Status: SHIPPED | OUTPATIENT
Start: 2024-11-29 | End: 2025-02-27

## 2024-11-29 RX ORDER — LANOLIN ALCOHOL/MO/W.PET/CERES
100 CREAM (GRAM) TOPICAL DAILY
Qty: 30 TABLET | Refills: 2 | Status: SHIPPED | OUTPATIENT
Start: 2024-11-30 | End: 2025-02-28

## 2024-11-29 RX ORDER — FOLIC ACID 1 MG/1
1 TABLET ORAL DAILY
Qty: 30 TABLET | Refills: 2 | Status: SHIPPED | OUTPATIENT
Start: 2024-11-30 | End: 2025-02-28

## 2024-11-29 RX ORDER — MULTIVIT-MIN/IRON FUM/FOLIC AC 7.5 MG-4
1 TABLET ORAL DAILY
Qty: 30 TABLET | Refills: 2 | Status: SHIPPED | OUTPATIENT
Start: 2024-11-30 | End: 2025-02-28

## 2024-11-29 ASSESSMENT — COGNITIVE AND FUNCTIONAL STATUS - GENERAL
MOBILITY SCORE: 24
DAILY ACTIVITIY SCORE: 24

## 2024-11-29 ASSESSMENT — PAIN - FUNCTIONAL ASSESSMENT: PAIN_FUNCTIONAL_ASSESSMENT: 0-10

## 2024-11-29 ASSESSMENT — PAIN SCALES - GENERAL: PAINLEVEL_OUTOF10: 0 - NO PAIN

## 2024-11-29 NOTE — CARE PLAN
The clinical goals for the shift include pt will remain safe and free from falls    Problem: Pain - Adult  Goal: Verbalizes/displays adequate comfort level or baseline comfort level  Outcome: Progressing  Flowsheets (Taken 11/29/2024 0955)  Verbalizes/displays adequate comfort level or baseline comfort level:   Encourage patient to monitor pain and request assistance   Assess pain using appropriate pain scale   Administer analgesics based on type and severity of pain and evaluate response   Implement non-pharmacological measures as appropriate and evaluate response   Consider cultural and social influences on pain and pain management   Notify Licensed Independent Practitioner if interventions unsuccessful or patient reports new pain     Problem: Safety - Adult  Goal: Free from fall injury  Outcome: Progressing     Problem: Discharge Planning  Goal: Discharge to home or other facility with appropriate resources  Outcome: Progressing     Problem: Heart Failure  Goal: Reduction in peripheral edema within 24 hours  Outcome: Progressing  Flowsheets (Taken 11/29/2024 0955)  Reduction in peripheral edema within 24 hours:   Monitor edema/skin/mucous membrane integrity   SCDs/elevate extremities  Goal: Report improvement of dyspnea/breathlessness this shift  Outcome: Progressing  Flowsheets (Taken 11/29/2024 0955)  Report improvement of dyspnea/breathlessness this shift:   Encourage activity/mobility/ROM   Ambulate/OOB 3 times daily   Incentive spirometry/deep breathing /HOB elevated elevated   Facilitate activity/mobility per patient tolerance/advance     Problem: Fall/Injury  Goal: Not fall by end of shift  Outcome: Progressing  Goal: Be free from injury by end of the shift  Outcome: Progressing  Goal: Verbalize understanding of personal risk factors for fall in the hospital  Outcome: Progressing  Goal: Verbalize understanding of risk factor reduction measures to prevent injury from fall in the home  Outcome:  Progressing  Goal: Use assistive devices by end of the shift  Outcome: Progressing  Goal: Pace activities to prevent fatigue by end of the shift  Outcome: Progressing

## 2024-11-29 NOTE — DISCHARGE SUMMARY
Discharge Diagnosis  Acute on chronic congestive heart failure, unspecified heart failure type    Issues Requiring Follow-Up  Follow-up with PCP, Coumadin clinic and cardiology    Discharge Meds     Medication List      START taking these medications     folic acid 1 mg tablet; Commonly known as: Folvite; Take 1 tablet (1 mg)   by mouth once daily.; Start taking on: November 30, 2024   metoprolol succinate XL 50 mg 24 hr tablet; Commonly known as:   Toprol-XL; Take 1 tablet (50 mg) by mouth once daily. Do not crush or   chew.; Start taking on: November 30, 2024   multivitamin with minerals tablet; Take 1 tablet by mouth once daily.;   Start taking on: November 30, 2024   polyethylene glycol 17 gram/dose powder; Commonly known as: Glycolax,   Miralax   thiamine 100 mg tablet; Commonly known as: Vitamin B-1; Take 1 tablet   (100 mg) by mouth once daily.; Start taking on: November 30, 2024   torsemide 20 mg tablet; Commonly known as: Demadex; Take 1 tablet (20   mg) by mouth once daily.; Start taking on: November 30, 2024     CHANGE how you take these medications     sacubitriL-valsartan 24-26 mg tablet; Commonly known as: Entresto; Take   1 tablet by mouth 2 times a day.; What changed: when to take this   warfarin 5 mg tablet; Commonly known as: Coumadin; Take 1.5 tablets (7.5   mg) by mouth once daily in the evening.; What changed: when to take this     CONTINUE taking these medications     empagliflozin 25 mg; Commonly known as: Jardiance; Take 1 tablet (25 mg)   by mouth once daily.   metFORMIN 500 mg tablet; Commonly known as: Glucophage; Take 1 tablet   (500 mg) by mouth once daily in the evening. Take with meals.   spironolactone 25 mg tablet; Commonly known as: Aldactone; Take 1 tablet   (25 mg) by mouth once daily.     STOP taking these medications     furosemide 40 mg tablet; Commonly known as: Lasix       Test Results Pending At Discharge  Pending Labs       No current pending labs.            [facility]  Course   Haider Ruiz is a 39 y.o. male with a past medical history of morbid obesity daily BMI of 43.6, systolic CHF ejection fraction 35%, paroxysmal atrial fibrillation, hypertension, type 2 diabetes mellitus, medical noncompliance and undiagnosed NESTOR who presented to [facility] ER complaining of shortness of breath especially with exertion and lower extremity edema bilaterally.  His symptoms have become progressively worse over the past month especially the last week.  He denies any chest pain abdominal pain fever chills nausea vomiting or diarrhea.  He admits to not taking his medications over the past few months.   Patient was admitted for further management, with consultation of cardiology in light of acute decompensated heart failure and atrial fibrillation.  Patient underwent DC CV successfully, with conversion to sinus rhythm.  He was appropriately diuresed to euvolemic state, with resolution of his symptoms of dyspnea.  Patient has history of noncompliance with regimen due to lack of follow-up with PCP and cardiology, and has not had any refill for a while.  All required medications were appropriately refill at discharge with refills to allow him to establish care with a PCP and follow-up with cardiology.  Patient on warfarin for his atrial fibrillation, with goal to be between 2 and 3, [facility] and INR monitoring.   On November 29, 2024, patient was found stable for discharge home with follow-up with cardiology, referral made to establish care with a PCP, and Coumadin clinic at [facility].    Greater than 30 minutes were dedicated to this this discharge that included educating patient and coordinating care.    Pertinent Physical Exam At Time of Discharge  Physical Exam  Constitutional: Obese gentleman, alert active, cooperative not in acute distress  Eyes: PERRLA, clear sclera  ENMT: Moist mucosal membranes, no exudate  Head / Neck:  Atraumatic, normocephalic, supple neck, JVP not visualized  Lungs: Patent airways, CTABL  Heart: RRR, S1S2, no murmurs appreciated, palpable pulses in all extremities  GI: Soft, NT, ND, bowel sounds present in all quadrants  MSK: Moves all extremities freely, no restriction  of ROM, no joint edema  Extremities: Bilateral lower extremities, peripheral edema  : No Beaver catheter inserted  Breast: Deferred  Neurological: AAO x 3 to person, place and date, facial muscles symmetrical, sensation intact, strength 4/4, no acute focal neurological deficits appreciated  Psychological: Appropriate mood and behavior  Outpatient Follow-Up  No future appointments.      Jaron Peña, DO

## 2024-11-29 NOTE — PROGRESS NOTES
Naya Ruiz is a 39 y.o. male on day 2 of admission presenting with Acute on chronic congestive heart failure, unspecified heart failure type.      Subjective   Patient was seen and examined bedside this morning, stated that he is feeling much better, looking forward to discharge.  Stated that she is going to be more compliant from now on, once he gets his medications, which were not able to prescribe as his pharmacy was closed.       Objective     Last Recorded Vitals  /66 (BP Location: Right arm, Patient Position: Sitting)   Pulse 91   Temp 36.2 °C (97.1 °F) (Temporal)   Resp 20   Wt (!) 191 kg (422 lb 2.9 oz)   SpO2 100%   Intake/Output last 3 Shifts:  No intake or output data in the 24 hours ending 11/28/24 1919    Admission Weight  Weight: (!) 154 kg (340 lb) (11/26/24 1436)    Daily Weight  11/28/24 : (!) 191 kg (422 lb 2.9 oz)    Image Results  Transesophageal Echo (LUCINA) With Possible Cardioversion     [facility], [address] [place] [address]               Tel [phone] and Fax [phone]    TRANSESOPHAGEAL ECHOCARDIOGRAM REPORT    WITH DIRECT CURRENT CARDIOVERSION     Patient Name:       NAYA RUIZ      Reading Physician:   [id number] Mark Hopkins MD  Study Date:         11/27/2024          Ordering Provider:   [id number] KURT LOWE  MRN/PID:            [id number]            Fellow:  Accession#:         [id number]        Nurse:               Sammy aFng RN  Date of Birth/Age:  1985 / 39      Sonographer:         Tommy Chávez RDCS                      years  Gender assigned at                     Additional Staff:    Alvaro DONNELLY  Birth:  Height:             187.96 cm           Admit Date:          11/26/2024  Weight:             196.41 kg            Admission Status:    Inpatient - Routine  BSA / BMI:          3.02 m2 / 55.59     Encounter#:          [id number]                      kg/m2  Blood Pressure:     127/85 mmHg         Department Location: [facility] Cath Lab    Study Type:    TRANSESOPHAGEAL ECHO (LUCINA) W/ POSSIBLE CARDIOVERSION  Diagnosis/ICD: Unspecified atrial fibrillation-I48.91  Indication:    A-fib with RVR  CPT Code:      Doppler Limited-03518; LUCINA Complete-36231; Color Doppler-54056    Patient History:  Drug Allergies:    None  Smoker:            Never.  Diabetes:          Yes  Pertinent History: HTN, CHF, Dyspnea and A-Fib. 39 y.o. male presents for                     LUCINA/CVN for A-fib with RVR.    Study Detail: The following Echo studies were performed: 2D, Doppler and 3D.                Patient's heart rhythm is atrial fibrillation. A bubble study was                not performed. The patient was sedated.       PHYSICIAN INTERPRETATION:  LUCINA Details: The LUCINA probe used was [id number]. Technically adequate omniplane transesophageal echocardiogram performed. Color flow Doppler echo was performed to assess for the presence of a patent foramen ovale.  LUCINA Medication: The pharynx was anesthetized with Cetacaine spray and viscous lidocaine. The patient was sedated by Anesthesia; please refer to anesthesia flow sheet for medications used.  LUCINA Procedure: The probe was passed without difficulty. Complications encountered during procedure: Patient tolerated the procedure well without any apparent complications.  LUCINA Cardioversion Procedure:  The patient was placed in a supine position and anterior-posterior  defibrillation patches were applied. A biphasic Zoll defibrillator was attached  to the patient and set to synchronous mode.  One synchronized biphasic external shock was delivered at 200 Joules in attempt  to cardiovert the patient from atrial fibrillation. The procedure was  successful, resulting in normal sinus rhythm.  The patient recovered  uneventfully from the effects on conscious and deep  sedation. The procedure was well tolerated. There were no complications. The  patient was discharged from the Cardiac Cath Lab hemodynamically stable and with  no neurological deficits.    Left Ventricle: The left ventricle was not well visualized. The left ventricular ejection fraction could not be measured. The left ventricular cavity size was not assessed. Left ventricular diastolic filling cannot be determined, due to atrial fibrillation/flutter.  Left Atrium: The left atrium is enlarged. There is a mobile interatrial septum. A bubble study using agitated saline was not performed. The left atrial appendage appears multilobed, there is no thrombus visualized in the left atrial appendage and there is no mass visualized in the left atrial appendage.  Right Ventricle: The right ventricle was not assessed. Right ventricular systolic function not assessed.  Right Atrium: The right atrium was not assessed.  Aortic Valve: The aortic valve is trileaflet. There is mild aortic valve regurgitation.  Mitral Valve: The mitral valve is mildly thickened. There is mild to moderate mitral valve regurgitation which is posteriorly directed.  Tricuspid Valve: The tricuspid valve is structurally normal. There is moderate tricuspid regurgitation. Reported right ventricular systolic pressure may be underestimated due to incomplete or suboptimal Doppler envelope.  Pulmonic Valve: The pulmonic valve is structurally normal. There is no indication of pulmonic valve regurgitation.  Pericardium: There is no pericardial effusion noted.  Aorta: The aortic root is normal. There is mild dilatation of the ascending aorta.  In comparison to the previous echocardiogram(s): There are no prior studies on this patient for comparison purposes.       CONCLUSIONS:   1. The left ventricle was not well visualized. The left ventricular ejection fraction could not be measured.   2. Left ventricular  diastolic filling cannot be determined, due to atrial fibrillation/flutter.   3. Successful cardioversion for atrial fibrillation resulting in normal sinus rhythm.   4. The left atrium is enlarged.   5. Mild to moderate mitral valve regurgitation.   6. Moderate tricuspid regurgitation visualized.   7. Mild aortic valve regurgitation.    QUANTITATIVE DATA SUMMARY:     [id number] Mark Hopkins MD  Electronically signed on 11/27/2024 at 3:34:36 PM       ** Final **  ECG 12 lead  Sinus tachycardia  Incomplete right bundle branch block  Anteroseptal infarct , age undetermined  Abnormal ECG  No previous ECGs available  See ED provider note for full interpretation and clinical correlation  Confirmed by Denise Bassett ([id number]) on 11/27/2024 11:53:14 AM      Physical Exam  Constitutional: Obese gentleman, alert active, cooperative not in acute distress  Eyes: PERRLA, clear sclera  ENMT: Moist mucosal membranes, no exudate  Head / Neck: Atraumatic, normocephalic, supple neck, JVP not visualized  Lungs: Patent airways, CTABL  Heart: RRR, S1S2, no murmurs appreciated, palpable pulses in all extremities  GI: Soft, NT, ND, bowel sounds present in all quadrants  MSK: Moves all extremities freely, no restriction  of ROM, no joint edema  Extremities: Bilateral lower extremities, peripheral edema  : No Beaver catheter inserted  Breast: Deferred  Neurological: AAO x 3 to person, place and date, facial muscles symmetrical, sensation intact, strength 4/4, no acute focal neurological deficits appreciated  Psychological: Appropriate mood and behavior  Relevant Results             Scheduled medications  empagliflozin, 10 mg, oral, Daily  folic acid, 1 mg, oral, Daily  insulin lispro, 0-10 Units, subcutaneous, TID AC  lidocaine, 0.1 mL, subcutaneous, Once  metoprolol succinate XL, 50 mg, oral, Daily  multivitamin with minerals, 1 tablet, oral, Daily  oxygen, , inhalation, Continuous - Inhalation  pantoprazole, 40 mg, oral, Daily before  breakfast   Or  pantoprazole, 40 mg, intravenous, Daily before breakfast  perflutren lipid microspheres, 0.5-10 mL of dilution, intravenous, Once in imaging  perflutren protein A microsphere, 0.5 mL, intravenous, Once in imaging  sacubitriL-valsartan, 1 tablet, oral, BID  spironolactone, 25 mg, oral, Daily  sulfur hexafluoride microsphr, 2 mL, intravenous, Once in imaging  thiamine, 100 mg, oral, Daily  [START ON 11/29/2024] torsemide, 20 mg, oral, Daily      Continuous medications     PRN medications  PRN medications: acetaminophen **OR** acetaminophen **OR** acetaminophen, dextrose, dextrose, glucagon, glucagon, labetaloL, metoprolol, ondansetron **OR** ondansetron, ondansetron, promethazine    Assessment/Plan      39 y.o. male with a past medical history of morbid obesity daily BMI of 43.6, systolic CHF ejection fraction 35%, paroxysmal atrial fibrillation, hypertension, type 2 diabetes mellitus, medical noncompliance and undiagnosed NESTOR who presented to [facility] ER complaining of shortness of breath      Acute on chronic systolic CHF  Metoprolol XL 50 mg daily  Entresto 24-26 1 tablet twice daily  Lasix 40 mg IV every 12 hours  Daily weight  Telemetry  Cardiology     Hypertension  Metoprolol XL 50 mg orally daily  Spironolactone 25 mg orally daily  Lasix 40 mg IV every 12 hours     Paroxysmal atrial fibrillation/atrial flutter  LUCINA DCCV 11/27, uneventfully  Toprol-XL 50 mg orally daily for rate control  Warfarin 5 mg orally daily, will receive 10 mg x 1 today  Daily INR  Goal INR 2-3     Alcohol use disorder  Will order thiamine 100 mg orally daily folic acid 1 mg orally daily and multivitamin 1 orally daily  Watch for signs and symptoms of withdrawal if so then start CIWA     Type 2 diabetes mellitus  Lispro per corrective scale  Jardiance 10 mg orally daily  He is supposed to be on metformin.  Will hold for now     Elevated troponin flat trend  No ACS     Morbid obesity BMI of 43  Lifestyle  modification      DVT prophylaxis  Warfarin 10 mg p.o. x 1  SCDs        Disposition: Presenting with heart failure, and atrial fibrillation, need further management, discharge dissipated tomorrow       Jaron Peña DO

## 2024-11-29 NOTE — PROGRESS NOTES
11/29/24 1033   Discharge Planning   Expected Discharge Disposition Home     Card s/o. Poss dc home today.

## 2024-11-29 NOTE — PROGRESS NOTES
Haider Ruiz is a 39 y.o. male admitted for Acute on chronic systolic heart failure . Pharmacy has been consulted for warfarin dosing and monitoring for Atrial Fibrillation/Atrial Flutter with goal INR of 2.0-3.0.     Home regimen   MON TUE WED THR FRI SAT SUN   Dose 7.5  mg 7.5  mg 7.5  mg 7.5  mg 7.5  mg 7.5  mg 7.5  mg   Total weekly dose: 52.5  Source: OP note     Labs  INR: 2.0  Hgb/Hct/Plt  Lab Results   Component Value Date    HGB 12.7 (L) 11/29/2024    HGB 12.6 (L) 11/28/2024    HGB 12.2 (L) 11/27/2024    HGB 12.9 (L) 11/26/2024        Lab Results   Component Value Date    HCT 39.6 (L) 11/29/2024    HCT 39.0 (L) 11/28/2024    HCT 36.7 (L) 11/27/2024    HCT 38.2 (L) 11/26/2024        Platelets   Date Value Ref Range Status   11/29/2024 232 150 - 450 x10*3/uL Final   11/28/2024 252 150 - 450 x10*3/uL Final   11/27/2024 240 150 - 450 x10*3/uL Final   11/26/2024 240 150 - 450 x10*3/uL Final      Warfarin Therapy   Current regimen: resuming home reigmen- it seems like patient was not consistently taking outpatient  Bridging: None needed  Interacting medications: no interacting medications    Dosing History During Current Admission  Date 11/26 11/27 11/28 11/29   INR 1.4 1.6 1.6 2.0   Dose  (mg) 5 10 10 7.5     Assessment and Plan  Patient's INR of 2.0 today is Therapeutic. Hemoglobin/hematocrit/platelet stable  Warfarin inpatient plan: Resume home dose 7.5 mg tonight   Monitor s/sx of bleeding including epistaxis, hematuria, unusual bruising, hemoptysis, hematochezia as well as s/sx of stroke including impaired speech, unilateral paralysis, blurry vision.  Pharmacy will continue to monitor the patient and adjust therapy as needed.      Thank you for the consult. Please do not hesitate to contact a pharmacist with any questions.    Caryn Raya, ChristaD

## 2024-11-29 NOTE — PROGRESS NOTES
Patient is medically ready for discharge  They are being discharged to: home  brother will pick patient up    TCC met with pt at bedside. Pt will dc with healthy at home. Dr Calderon will follow as pcp for coumadin. Pt has script for INR draw on Monday. Pt has new pcp appt with Dr Moeller NP 12/9/24 at 915 am. Pt put appointment in his phone. Pt left before new AVS was printed.

## 2024-11-29 NOTE — CARE PLAN
The patient's goals for the shift include      The clinical goals for the shift include pt will remain safe throughout the shift    Over the shift, the patient did not make progress toward the following goals. Barriers to progression include. Recommendations to address these barriers include.

## 2024-11-29 NOTE — PROGRESS NOTES
11/29/24 1201   Discharge Planning   Expected Discharge Disposition Home     Patient is medically ready for discharge  They are being discharged to: home  Brother will pick patient up    Pt received meds to beds     Patient denies any other needs

## 2024-11-29 NOTE — PROGRESS NOTES
Subjective Data:  No chest pain, no chest pressure. Not in telemetry at the moment of my visit - last registry showed sinus rhythm      Overnight Events:    None     Objective Data:  Last Recorded Vitals:  Vitals:    11/28/24 2300 11/29/24 0426 11/29/24 0816 11/29/24 1158   BP: 158/90 133/84 130/76 (!) 159/91   BP Location: Right arm Right arm Right arm Right arm   Patient Position: Sitting Lying Lying Sitting   Pulse:   92 94   Resp: 18 20 17 17   Temp: 36.5 °C (97.7 °F) 36.6 °C (97.9 °F) 36.9 °C (98.5 °F) 36 °C (96.8 °F)   TempSrc: Temporal Temporal Temporal Temporal   SpO2: 99% 100% 97% 99%   Weight:       Height:           Last Labs:  CBC - 11/29/2024:  5:49 AM  8.7 12.7 232    39.6      CMP - 11/28/2024:  5:16 AM  8.1 _ _ --- _   _ _ _ _      PTT - No results in last year.  2.0   22.3 _     TROPHS   Date/Time Value Ref Range Status   11/26/2024 08:14 PM 58 0 - 20 ng/L Final     Comment:     Previous result verified on 11/26/2024 1638 on specimen/case [id number] called with component TRPHS for procedure Troponin I, High Sensitivity, Initial with value 55 ng/L.   11/26/2024 06:03 PM 61 0 - 20 ng/L Final     Comment:     Previous result verified on 11/26/2024 1638 on specimen/case [id number] called with component TRPHS for procedure Troponin I, High Sensitivity, Initial with value 55 ng/L.   11/26/2024 03:42 PM 55 0 - 20 ng/L Final     BNP   Date/Time Value Ref Range Status   11/26/2024 03:42  0 - 99 pg/mL Final     HGBA1C   Date/Time Value Ref Range Status   11/27/2024 04:45 AM 7.6 See comment % Final   02/28/2024 05:51 AM 7.5 4.3 - 5.6 % Final     Comment:     American Diabetes Association guidelines indicate that patients with HgbA1c in the range 5.7-6.4% are at increased risk for development of diabetes, and intervention by lifestyle modification may be beneficial. HgbA1c greater or equal to 6.5% is considered diagnostic of diabetes.     LDLCALC   Date/Time Value Ref Range Status   11/27/2024  "04:45 AM 63 <=99 mg/dL Final     Comment:                                 Near   Borderline      AGE      Desirable  Optimal    High     High     Very High     0-19 Y     0 - 109     ---    110-129   >/= 130     ----    20-24 Y     0 - 119     ---    120-159   >/= 160     ----      >24 Y     0 -  99   100-129  130-159   160-189     >/=190       VLDL   Date/Time Value Ref Range Status   11/27/2024 04:45 AM 15 0 - 40 mg/dL Final      Last I/O:  I/O last 3 completed shifts:  In: 120 (0.6 mL/kg) [P.O.:120]  Out: - (0 mL/kg)   Weight: 191.5 kg     Past Cardiology Tests (Last 3 Years):  EKG:  ECG 12 lead 11/26/2024    Echo:  Transesophageal Echo (LUCINA) With Possible Cardioversion 11/27/2024    Ejection Fractions:  No results found for: \"EF\"  Cath:  No results found for this or any previous visit from the past 1095 days.    Stress Test:  No results found for this or any previous visit from the past 1095 days.    Cardiac Imaging:  No results found for this or any previous visit from the past 1095 days.      Inpatient Medications:  Scheduled medications   Medication Dose Route Frequency    folic acid  1 mg oral Daily    insulin lispro  0-10 Units subcutaneous TID AC    lidocaine  0.1 mL subcutaneous Once    losartan  50 mg oral Daily    metoprolol succinate XL  50 mg oral Daily    multivitamin with minerals  1 tablet oral Daily    oxygen   inhalation Continuous - Inhalation    pantoprazole  40 mg oral Daily before breakfast    Or    pantoprazole  40 mg intravenous Daily before breakfast    perflutren protein A microsphere  0.5 mL intravenous Once in imaging    spironolactone  25 mg oral Daily    sulfur hexafluoride microsphr  2 mL intravenous Once in imaging    thiamine  100 mg oral Daily    torsemide  20 mg oral Daily     PRN medications   Medication    acetaminophen    Or    acetaminophen    Or    acetaminophen    dextrose    dextrose    glucagon    glucagon    labetaloL    metoprolol    ondansetron    Or    ondansetron    " ondansetron    promethazine     Continuous Medications   Medication Dose Last Rate       Physical Exam:  General:  Patient is awake, alert, and oriented.  Patient is in no acute distress.  HEENT:  Normocephalic.  Moist mucosa.    Neck:  Unable to assess  Cardiovascular:  Regular rate and rhythm.  Normal S1 and S2, no murmurs, rubs or gallops  Pulmonary:  Bilaterally diminished in bases.   Abdomen:  Morbid obesity,  Non-tender.   Non-distended.  Positive bowel sounds.  Lower Extremities:  2+ pedal pulses. ++ 2 lower extremity edema  Neurologic:  Alert and oriented x3. No focal deficit.   Skin: Skin warm and dry, normal skin turgor.   Psychiatric: Normal affect.        Assessment/Plan    Haider Ruiz is a 39 y.o. male with past medical history of Morbid obesity, HFrEF (LVEF 35% on echo 2/2024), paroxysmal atrial fibrillation, hypertension, DM, who presented to [facility] with shortness of breath, and bilateral lower extremity edema.  Cardiology consulted for CHF.       39-year-old male with a history of morbid obesity, HFrEF (LVEF 35% on 2/2024 echo), paroxysmal atrial fibrillation, hypertension, and diabetes, presents with shortness of breath and bilateral lower extremity edema; cardiology was consulted for CHF management. He was first diagnosed with acute heart failure with reduced EF during a hospitalization at [facility] in 2/2024, where he also experienced atrial fibrillation requiring DCCV but was unable to undergo LHC for ischemic evaluation due to cath lab weight restrictions, with a discharge weight of 410 lbs. Since discharge, he has not been taking any cardiovascular medications (Entresto, Toprol, spironolactone, empagliflozin, warfarin) and has not followed up with cardiology. He denies chest pain, palpitations, syncope, or dizziness but reports significant leg swelling and stress following his father’s recent passing.      Successful cardioversion performed on 11/27/24.     Assessment:     # Acute on chronic  systolic heart failure.     - first diagnosed 2/2024.  He has not taken any medications since hospital discharge 2/2024.    -     - admit weight 433 lbs    -declined life vest in the past.     - Echocardiogram done, pending results   - RX insurance terminated - changed from entresto to losartan due to cost issues   - SGLT2 inhibitors to be re-started when he has better insurance coverage      # Hypertension, uncontrolled   - Antihypertensive medication to be titrated as an outpatient     # Paroxsymal atrial flutter     - EKG showing atrial flutter     -Successful cardioversion performed on 11/27/24.     # DM2    - HbA1C 7.2 in 2/2024    - HbA1c 7.6 in 11/24  # Morbid obesity  # Medication non adherence     # Elevated troponin.  Low level elevation with a flat trend consistent with a non-MI troponin elevation / chronic non-traumatic myocardial injury in the setting of above. Core measures do not apply.  EKG non ischemic.  There are no significant clinical signs / symptoms or ischemic changes consistent with ACS.     11/27 > CT angio does not show PE.  Volume up, in atrial flutter, room air.   11/28 > Clinically improved - Successful cardioversion performed on 11/27/24.     Plan:  - Continue with Torsemide 20mg once a day plus 20mg PRN  - Continue GDMT:  Losartan 50mg, Toprol 25mg daily, spironolactone 25mg daily  - Agree with warfarin, goal INR 2-3  - Diabetes therapy optimization by primary team  - Bariatric surgery evaluation as an outpatient  - Sleep medicine evaluation as an outpatient for possible NESTOR  - Follow up with cardiology as an outpatient  - No barriers to discharge from a cardiological standpoint.      Code Status:  Full Code    Mark Hopkins MD

## 2024-12-06 LAB
ATRIAL RATE: 92 BPM
P AXIS: 53 DEGREES
P OFFSET: 177 MS
P ONSET: 117 MS
PR INTERVAL: 196 MS
Q ONSET: 215 MS
QRS COUNT: 15 BEATS
QRS DURATION: 96 MS
QT INTERVAL: 406 MS
QTC CALCULATION(BAZETT): 502 MS
QTC FREDERICIA: 468 MS
R AXIS: 80 DEGREES
T AXIS: 15 DEGREES
T OFFSET: 418 MS
VENTRICULAR RATE: 92 BPM

## 2024-12-16 ENCOUNTER — APPOINTMENT (OUTPATIENT)
Dept: PRIMARY CARE | Facility: CLINIC | Age: 39
End: 2024-12-16

## 2024-12-30 ENCOUNTER — PHARMACY VISIT (OUTPATIENT)
Dept: PHARMACY | Facility: CLINIC | Age: 39
End: 2024-12-30
Payer: COMMERCIAL

## 2024-12-30 PROCEDURE — RXMED WILLOW AMBULATORY MEDICATION CHARGE

## 2025-01-31 PROCEDURE — RXMED WILLOW AMBULATORY MEDICATION CHARGE

## 2025-02-03 ENCOUNTER — PHARMACY VISIT (OUTPATIENT)
Dept: PHARMACY | Facility: CLINIC | Age: 40
End: 2025-02-03
Payer: COMMERCIAL
